# Patient Record
Sex: MALE | Race: WHITE | NOT HISPANIC OR LATINO | URBAN - METROPOLITAN AREA
[De-identification: names, ages, dates, MRNs, and addresses within clinical notes are randomized per-mention and may not be internally consistent; named-entity substitution may affect disease eponyms.]

---

## 2018-06-28 ENCOUNTER — INPATIENT (INPATIENT)
Facility: HOSPITAL | Age: 73
LOS: 3 days | Discharge: HOME | End: 2018-07-02
Attending: INTERNAL MEDICINE | Admitting: INTERNAL MEDICINE

## 2018-06-28 VITALS
TEMPERATURE: 98 F | OXYGEN SATURATION: 96 % | HEART RATE: 88 BPM | RESPIRATION RATE: 20 BRPM | SYSTOLIC BLOOD PRESSURE: 115 MMHG | DIASTOLIC BLOOD PRESSURE: 68 MMHG

## 2018-06-28 LAB
ALBUMIN SERPL ELPH-MCNC: 4.3 G/DL — SIGNIFICANT CHANGE UP (ref 3.5–5.2)
ALP SERPL-CCNC: 60 U/L — SIGNIFICANT CHANGE UP (ref 30–115)
ALT FLD-CCNC: 19 U/L — SIGNIFICANT CHANGE UP (ref 0–41)
ANION GAP SERPL CALC-SCNC: 16 MMOL/L — HIGH (ref 7–14)
APTT BLD: 29.8 SEC — SIGNIFICANT CHANGE UP (ref 27–39.2)
AST SERPL-CCNC: 19 U/L — SIGNIFICANT CHANGE UP (ref 0–41)
BASOPHILS # BLD AUTO: 0.04 K/UL — SIGNIFICANT CHANGE UP (ref 0–0.2)
BASOPHILS NFR BLD AUTO: 0.2 % — SIGNIFICANT CHANGE UP (ref 0–1)
BILIRUB SERPL-MCNC: 0.2 MG/DL — SIGNIFICANT CHANGE UP (ref 0.2–1.2)
BUN SERPL-MCNC: 25 MG/DL — HIGH (ref 10–20)
CALCIUM SERPL-MCNC: 8.7 MG/DL — SIGNIFICANT CHANGE UP (ref 8.5–10.1)
CHLORIDE SERPL-SCNC: 102 MMOL/L — SIGNIFICANT CHANGE UP (ref 98–110)
CK MB CFR SERPL CALC: 3.4 NG/ML — SIGNIFICANT CHANGE UP (ref 0.6–6.3)
CK SERPL-CCNC: 277 U/L — HIGH (ref 0–225)
CO2 SERPL-SCNC: 21 MMOL/L — SIGNIFICANT CHANGE UP (ref 17–32)
CREAT SERPL-MCNC: 1 MG/DL — SIGNIFICANT CHANGE UP (ref 0.7–1.5)
EOSINOPHIL # BLD AUTO: 0.13 K/UL — SIGNIFICANT CHANGE UP (ref 0–0.7)
EOSINOPHIL NFR BLD AUTO: 0.8 % — SIGNIFICANT CHANGE UP (ref 0–8)
GAS PNL BLDV: SIGNIFICANT CHANGE UP
GLUCOSE SERPL-MCNC: 195 MG/DL — HIGH (ref 70–99)
HCT VFR BLD CALC: 42.3 % — SIGNIFICANT CHANGE UP (ref 42–52)
HGB BLD-MCNC: 14 G/DL — SIGNIFICANT CHANGE UP (ref 14–18)
IMM GRANULOCYTES NFR BLD AUTO: 0.6 % — HIGH (ref 0.1–0.3)
INR BLD: 1.07 RATIO — SIGNIFICANT CHANGE UP (ref 0.65–1.3)
LYMPHOCYTES # BLD AUTO: 1.86 K/UL — SIGNIFICANT CHANGE UP (ref 1.2–3.4)
LYMPHOCYTES # BLD AUTO: 11.4 % — LOW (ref 20.5–51.1)
MAGNESIUM SERPL-MCNC: 2.2 MG/DL — SIGNIFICANT CHANGE UP (ref 1.8–2.4)
MCHC RBC-ENTMCNC: 29.4 PG — SIGNIFICANT CHANGE UP (ref 27–31)
MCHC RBC-ENTMCNC: 33.1 G/DL — SIGNIFICANT CHANGE UP (ref 32–37)
MCV RBC AUTO: 88.7 FL — SIGNIFICANT CHANGE UP (ref 80–94)
MONOCYTES # BLD AUTO: 1.4 K/UL — HIGH (ref 0.1–0.6)
MONOCYTES NFR BLD AUTO: 8.6 % — SIGNIFICANT CHANGE UP (ref 1.7–9.3)
NEUTROPHILS # BLD AUTO: 12.75 K/UL — HIGH (ref 1.4–6.5)
NEUTROPHILS NFR BLD AUTO: 78.4 % — HIGH (ref 42.2–75.2)
NT-PROBNP SERPL-SCNC: 41 PG/ML — SIGNIFICANT CHANGE UP (ref 0–300)
PLATELET # BLD AUTO: 202 K/UL — SIGNIFICANT CHANGE UP (ref 130–400)
POTASSIUM SERPL-MCNC: 4.5 MMOL/L — SIGNIFICANT CHANGE UP (ref 3.5–5)
POTASSIUM SERPL-SCNC: 4.5 MMOL/L — SIGNIFICANT CHANGE UP (ref 3.5–5)
PROT SERPL-MCNC: 6.8 G/DL — SIGNIFICANT CHANGE UP (ref 6–8)
PROTHROM AB SERPL-ACNC: 11.5 SEC — SIGNIFICANT CHANGE UP (ref 9.95–12.87)
RBC # BLD: 4.77 M/UL — SIGNIFICANT CHANGE UP (ref 4.7–6.1)
RBC # FLD: 12.8 % — SIGNIFICANT CHANGE UP (ref 11.5–14.5)
SODIUM SERPL-SCNC: 139 MMOL/L — SIGNIFICANT CHANGE UP (ref 135–146)
TROPONIN T SERPL-MCNC: <0.01 NG/ML — SIGNIFICANT CHANGE UP
TYPE + AB SCN PNL BLD: SIGNIFICANT CHANGE UP
WBC # BLD: 16.27 K/UL — HIGH (ref 4.8–10.8)
WBC # FLD AUTO: 16.27 K/UL — HIGH (ref 4.8–10.8)

## 2018-06-28 RX ORDER — ASPIRIN/CALCIUM CARB/MAGNESIUM 324 MG
162 TABLET ORAL ONCE
Qty: 0 | Refills: 0 | Status: COMPLETED | OUTPATIENT
Start: 2018-06-28 | End: 2018-06-28

## 2018-06-28 RX ORDER — SODIUM CHLORIDE 9 MG/ML
1000 INJECTION, SOLUTION INTRAVENOUS ONCE
Qty: 0 | Refills: 0 | Status: COMPLETED | OUTPATIENT
Start: 2018-06-28 | End: 2018-06-28

## 2018-06-28 RX ORDER — CLOPIDOGREL BISULFATE 75 MG/1
600 TABLET, FILM COATED ORAL ONCE
Qty: 0 | Refills: 0 | Status: COMPLETED | OUTPATIENT
Start: 2018-06-28 | End: 2018-06-28

## 2018-06-28 RX ADMIN — SODIUM CHLORIDE 2000 MILLILITER(S): 9 INJECTION, SOLUTION INTRAVENOUS at 22:15

## 2018-06-28 RX ADMIN — Medication 162 MILLIGRAM(S): at 22:21

## 2018-06-28 RX ADMIN — CLOPIDOGREL BISULFATE 600 MILLIGRAM(S): 75 TABLET, FILM COATED ORAL at 22:20

## 2018-06-28 NOTE — ED PROVIDER NOTE - CARE PLAN
Principal Discharge DX:	STEMI (ST elevation myocardial infarction) Principal Discharge DX:	STEMI (ST elevation myocardial infarction)  Assessment and plan of treatment:	Plan: CODE STEMI, EKG, CXR, labs, ASA, plavix, Cath lab, admission to CCU.

## 2018-06-28 NOTE — ED ADULT NURSE REASSESSMENT NOTE - NS ED NURSE REASSESS COMMENT FT1
STEMI activated in ED. pt placed in cardiac monitor, pacing pads in place, cardiac fellow at bedside for evaluation. continue to monitor.

## 2018-06-28 NOTE — ED PROVIDER NOTE - ATTENDING CONTRIBUTION TO CARE
I personally evaluated the patient. I reviewed the Resident’s or Physician Assistant’s note (as assigned above), and agree with the findings and plan except as documented in my note.  Pt polish speaking, daughter in law translating as preferred by pt, no EKG was emailed, EKg strip from EMS in ED showing ii-avf elevation confirmed by EKG obtained in ED. STEMI called immediately and cardiology was called ahead of time as this was a notification for possible STEMI (but no EKG was emailed or transferred over). STEMI called in ED. 73 y/o M w/ pmhx of cad s/p 2 cardiac stents, htn BIBEMS for L sided chest pain associated with diaphoresis and SOB onset at 630pm while walking around. Worsened so called for ambulance. No fever, chills, n/v, pleuritic cp, palpitations, cough, ha/lh/dizziness, numbness/tingling, neck pain/ stiffness, abd pain, diarrhea, constipation, melena/brbpr, urinary symptoms, trauma, weakness, edema, calf pain/swelling/erythema, sick contacts, recent travel or rash.  on exam: wdwn male sitting on stretcher speaking full sentences, no rash, mmm, neck supple. non-tender , radial pulses 2/4 b/l, no jvd, no pain to palpation to chest wall, no pain with movement/ not reproducible, ctabl w/ breath sounds present b/l, no wheezing or crackles, good air exchange, good respiratory effort, no accessory muscle use, no tachypnea, no stridor, bs present throughout all 4 quadrants, abd soft, nd, nt, no rebound tenderness or guarding, no cvat, FROM of ext, no calf pain/swelling/erythema, AAOx3. motor 5/5 and sensation intact throughout upper and lower ext. no focal deficits.

## 2018-06-28 NOTE — CHART NOTE - NSCHARTNOTEFT_GEN_A_CORE
STEMI code overheaded to which I responded and arrived to asses patient. EKG reviewed and patient examined personally by myself. Case d/w Interventional Cardiology attending (Dr. Martel), patient assessed to require emergent cardiac catheterization. Patient given medical treatment as per ACS protocol, will be transferred to cath lab for emergent PCI. Patient informed regarding this and agrees to treatment plan. Case d/w ED staff by myself. Detailed consult note to follow. Salient features of medical plan discussed below:    - angiogram/PCI  - ASA, plavix loading dose, IV heparin, lipitor   - further plan to follow angiogram

## 2018-06-28 NOTE — ED PROVIDER NOTE - CRITICAL CARE PROVIDED
direct patient care (not related to procedure)/consult w/ pt's family directly relating to pts condition/interpretation of diagnostic studies/additional history taking/consultation with other physicians

## 2018-06-28 NOTE — ED ADULT NURSE NOTE - OBJECTIVE STATEMENT
BIBA from home, notification for STEMI. pt c/o left side chest pain started at 630 pm tonight while walking around the house, diaphoretic, and SOB. received 162 mg ASA PO from EMS.

## 2018-06-28 NOTE — ED PROVIDER NOTE - OBJECTIVE STATEMENT
73 y/o M, h/o 2 cardiac stents, BIB EMS for L sided chest pain that radiates to his neck a/w diaphoresis, and SOB onset at 630pm while walking around. His symptoms were not relieved with rest. 73 y/o M, h/o 2 cardiac stents, and high cholesterol, BIB EMS for L sided chest pain that radiates to his neck a/w diaphoresis, and SOB onset at 630pm while walking around. His symptoms were not relieved with rest. No episodes of similar symptoms in the past. Denies palpitations, cough, fever, n/v, abd pain,

## 2018-06-29 LAB
ANION GAP SERPL CALC-SCNC: 12 MMOL/L — SIGNIFICANT CHANGE UP (ref 7–14)
APTT BLD: 31.9 SEC — SIGNIFICANT CHANGE UP (ref 27–39.2)
BASOPHILS # BLD AUTO: 0.01 K/UL — SIGNIFICANT CHANGE UP (ref 0–0.2)
BASOPHILS NFR BLD AUTO: 0.1 % — SIGNIFICANT CHANGE UP (ref 0–1)
BUN SERPL-MCNC: 22 MG/DL — HIGH (ref 10–20)
CALCIUM SERPL-MCNC: 8.3 MG/DL — LOW (ref 8.5–10.1)
CHLORIDE SERPL-SCNC: 103 MMOL/L — SIGNIFICANT CHANGE UP (ref 98–110)
CHOLEST SERPL-MCNC: 100 MG/DL — SIGNIFICANT CHANGE UP (ref 100–200)
CHOLEST SERPL-MCNC: 101 MG/DL — SIGNIFICANT CHANGE UP (ref 100–200)
CK MB CFR SERPL CALC: 128 NG/ML — HIGH (ref 0.6–6.3)
CK MB CFR SERPL CALC: 72.8 NG/ML — HIGH (ref 0.6–6.3)
CK MB CFR SERPL CALC: 93.8 NG/ML — HIGH (ref 0.6–6.3)
CK SERPL-CCNC: 1386 U/L — HIGH (ref 0–225)
CK SERPL-CCNC: 1570 U/L — HIGH (ref 0–225)
CO2 SERPL-SCNC: 21 MMOL/L — SIGNIFICANT CHANGE UP (ref 17–32)
CREAT SERPL-MCNC: 0.7 MG/DL — SIGNIFICANT CHANGE UP (ref 0.7–1.5)
EOSINOPHIL # BLD AUTO: 0 K/UL — SIGNIFICANT CHANGE UP (ref 0–0.7)
EOSINOPHIL NFR BLD AUTO: 0 % — SIGNIFICANT CHANGE UP (ref 0–8)
ESTIMATED AVERAGE GLUCOSE: 126 MG/DL — HIGH (ref 68–114)
ESTIMATED AVERAGE GLUCOSE: 126 MG/DL — HIGH (ref 68–114)
GLUCOSE SERPL-MCNC: 150 MG/DL — HIGH (ref 70–99)
HBA1C BLD-MCNC: 6 % — HIGH (ref 4–5.6)
HBA1C BLD-MCNC: 6 % — HIGH (ref 4–5.6)
HCT VFR BLD CALC: 38.7 % — LOW (ref 42–52)
HDLC SERPL-MCNC: 46 MG/DL — SIGNIFICANT CHANGE UP (ref 40–125)
HDLC SERPL-MCNC: 47 MG/DL — SIGNIFICANT CHANGE UP (ref 40–125)
HGB BLD-MCNC: 12.8 G/DL — LOW (ref 14–18)
IMM GRANULOCYTES NFR BLD AUTO: 0.4 % — HIGH (ref 0.1–0.3)
INR BLD: 1.11 RATIO — SIGNIFICANT CHANGE UP (ref 0.65–1.3)
LIPID PNL WITH DIRECT LDL SERPL: 42 MG/DL — SIGNIFICANT CHANGE UP (ref 4–129)
LIPID PNL WITH DIRECT LDL SERPL: 46 MG/DL — SIGNIFICANT CHANGE UP (ref 4–129)
LYMPHOCYTES # BLD AUTO: 0.75 K/UL — LOW (ref 1.2–3.4)
LYMPHOCYTES # BLD AUTO: 6.7 % — LOW (ref 20.5–51.1)
MCHC RBC-ENTMCNC: 29.4 PG — SIGNIFICANT CHANGE UP (ref 27–31)
MCHC RBC-ENTMCNC: 33.1 G/DL — SIGNIFICANT CHANGE UP (ref 32–37)
MCV RBC AUTO: 89 FL — SIGNIFICANT CHANGE UP (ref 80–94)
MONOCYTES # BLD AUTO: 0.59 K/UL — SIGNIFICANT CHANGE UP (ref 0.1–0.6)
MONOCYTES NFR BLD AUTO: 5.3 % — SIGNIFICANT CHANGE UP (ref 1.7–9.3)
NEUTROPHILS # BLD AUTO: 9.84 K/UL — HIGH (ref 1.4–6.5)
NEUTROPHILS NFR BLD AUTO: 87.5 % — HIGH (ref 42.2–75.2)
NRBC # BLD: 0 /100 WBCS — SIGNIFICANT CHANGE UP (ref 0–0)
PLATELET # BLD AUTO: 163 K/UL — SIGNIFICANT CHANGE UP (ref 130–400)
POTASSIUM SERPL-MCNC: 4.8 MMOL/L — SIGNIFICANT CHANGE UP (ref 3.5–5)
POTASSIUM SERPL-SCNC: 4.8 MMOL/L — SIGNIFICANT CHANGE UP (ref 3.5–5)
PROTHROM AB SERPL-ACNC: 12 SEC — SIGNIFICANT CHANGE UP (ref 9.95–12.87)
RBC # BLD: 4.35 M/UL — LOW (ref 4.7–6.1)
RBC # FLD: 12.9 % — SIGNIFICANT CHANGE UP (ref 11.5–14.5)
SODIUM SERPL-SCNC: 136 MMOL/L — SIGNIFICANT CHANGE UP (ref 135–146)
TOTAL CHOLESTEROL/HDL RATIO MEASUREMENT: 2.1 RATIO — LOW (ref 4–5.5)
TOTAL CHOLESTEROL/HDL RATIO MEASUREMENT: 2.2 RATIO — LOW (ref 4–5.5)
TRIGL SERPL-MCNC: 43 MG/DL — SIGNIFICANT CHANGE UP (ref 10–149)
TRIGL SERPL-MCNC: 84 MG/DL — SIGNIFICANT CHANGE UP (ref 10–149)
TROPONIN T SERPL-MCNC: 3.75 NG/ML — CRITICAL HIGH
TROPONIN T SERPL-MCNC: 3.95 NG/ML — CRITICAL HIGH
WBC # BLD: 11.23 K/UL — HIGH (ref 4.8–10.8)
WBC # FLD AUTO: 11.23 K/UL — HIGH (ref 4.8–10.8)

## 2018-06-29 RX ORDER — EPTIFIBATIDE 2 MG/ML
2 INJECTION, SOLUTION INTRAVENOUS
Qty: 75 | Refills: 0 | Status: DISCONTINUED | OUTPATIENT
Start: 2018-06-29 | End: 2018-06-30

## 2018-06-29 RX ORDER — ENOXAPARIN SODIUM 100 MG/ML
40 INJECTION SUBCUTANEOUS EVERY 24 HOURS
Qty: 0 | Refills: 0 | Status: DISCONTINUED | OUTPATIENT
Start: 2018-06-29 | End: 2018-07-02

## 2018-06-29 RX ORDER — CLOPIDOGREL BISULFATE 75 MG/1
1 TABLET, FILM COATED ORAL
Qty: 0 | Refills: 0 | COMMUNITY

## 2018-06-29 RX ORDER — CLOPIDOGREL BISULFATE 75 MG/1
75 TABLET, FILM COATED ORAL DAILY
Qty: 0 | Refills: 0 | Status: DISCONTINUED | OUTPATIENT
Start: 2018-06-29 | End: 2018-06-30

## 2018-06-29 RX ORDER — SODIUM CHLORIDE 9 MG/ML
1000 INJECTION INTRAMUSCULAR; INTRAVENOUS; SUBCUTANEOUS
Qty: 0 | Refills: 0 | Status: DISCONTINUED | OUTPATIENT
Start: 2018-06-29 | End: 2018-07-02

## 2018-06-29 RX ORDER — ATORVASTATIN CALCIUM 80 MG/1
80 TABLET, FILM COATED ORAL AT BEDTIME
Qty: 0 | Refills: 0 | Status: DISCONTINUED | OUTPATIENT
Start: 2018-06-29 | End: 2018-07-02

## 2018-06-29 RX ORDER — ASPIRIN/CALCIUM CARB/MAGNESIUM 324 MG
81 TABLET ORAL DAILY
Qty: 0 | Refills: 0 | Status: DISCONTINUED | OUTPATIENT
Start: 2018-06-29 | End: 2018-07-02

## 2018-06-29 RX ORDER — METOPROLOL TARTRATE 50 MG
12.5 TABLET ORAL
Qty: 0 | Refills: 0 | Status: DISCONTINUED | OUTPATIENT
Start: 2018-06-29 | End: 2018-07-02

## 2018-06-29 RX ORDER — EPTIFIBATIDE 2 MG/ML
0.5 INJECTION, SOLUTION INTRAVENOUS
Qty: 75 | Refills: 0 | Status: DISCONTINUED | OUTPATIENT
Start: 2018-06-29 | End: 2018-06-29

## 2018-06-29 RX ORDER — METOPROLOL TARTRATE 50 MG
25 TABLET ORAL
Qty: 0 | Refills: 0 | Status: DISCONTINUED | OUTPATIENT
Start: 2018-06-29 | End: 2018-06-29

## 2018-06-29 RX ADMIN — CLOPIDOGREL BISULFATE 75 MILLIGRAM(S): 75 TABLET, FILM COATED ORAL at 11:11

## 2018-06-29 RX ADMIN — SODIUM CHLORIDE 75 MILLILITER(S): 9 INJECTION INTRAMUSCULAR; INTRAVENOUS; SUBCUTANEOUS at 01:35

## 2018-06-29 RX ADMIN — EPTIFIBATIDE 14.58 MICROGRAM(S)/KG/MIN: 2 INJECTION, SOLUTION INTRAVENOUS at 04:23

## 2018-06-29 RX ADMIN — ATORVASTATIN CALCIUM 80 MILLIGRAM(S): 80 TABLET, FILM COATED ORAL at 21:47

## 2018-06-29 RX ADMIN — ENOXAPARIN SODIUM 40 MILLIGRAM(S): 100 INJECTION SUBCUTANEOUS at 18:01

## 2018-06-29 RX ADMIN — Medication 81 MILLIGRAM(S): at 11:11

## 2018-06-29 NOTE — H&P ADULT - PMH
CAD (coronary artery disease)  s/p pci  H/O heart artery stent  x 2  HLD (hyperlipidemia)    HTN (hypertension)

## 2018-06-29 NOTE — H&P ADULT - NSHPLABSRESULTS_GEN_ALL_CORE
14.0   16.27 )-----------( 202      ( 28 Jun 2018 22:31 )             42.3     139  |  102  |  25<H>  ----------------------------<  195<H>  4.5   |  21  |  1.0    Ca    8.7      28 Jun 2018 22:31  Mg     2.2     06-28    TPro  6.8  /  Alb  4.3  /  TBili  0.2  /  DBili  x   /  AST  19  /  ALT  19  /  AlkPhos  60  06-28        PT/INR - ( 28 Jun 2018 22:31 )   PT: 11.50 sec;   INR: 1.07 ratio      PTT - ( 28 Jun 2018 22:31 )  PTT:29.8 sec    CARDIAC MARKERS ( 28 Jun 2018 22:31 )  x     / <0.01 ng/mL / 277 U/L / x     / 3.4 ng/mL    CAPILLARY BLOOD GLUCOSE  181 (28 Jun 2018 22:07)

## 2018-06-29 NOTE — PATIENT PROFILE ADULT. - NS PRO PT REFERRAL QUES 2 YN
Patient placed on continuous cardiac monitor, automatic blood pressure cuff and continuous pulse oximeter.   no

## 2018-06-29 NOTE — H&P ADULT - ASSESSMENT
# STEMI  - cardio following  - s/p pci  - c/w aspirin & plavix  - c/w lipitor    # CAD s/p pci  - c/w home meds    # DLD/ HTN  - lipitor  - c/w home meds    # DVT ppx  - sc lovenox    # Dispo  - pending 72 yom with pmh of with pmh of CAD s/p PCI * 2, htn, dld p/w cc of chest pain since ~ 3 hours ptp.    # STEMI  - cardio following  - s/p JULIANE to RCA  - c/w aspirin & plavix  - c/w lipitor  - on integrin as per cardio  - EKG in am    # CAD s/p pci  - c/w home meds    # DLD/ HTN  - lipitor  - c/w home meds    # DVT ppx  - sc lovenox    # Dispo  - pending 72 yom with pmh of with pmh of CAD s/p PCI * 2, htn, dld p/w cc of chest pain since ~ 3 hours ptp.    # STEMI  - cardio following  - s/p JULIANE to RCA  - c/w aspirin & plavix  - c/w lipitor  - c/w integrillin drip as per cardio  - EKG in am    # CAD s/p pci  - c/w home meds    # DLD/ HTN  - lipitor  - c/w home meds    # DVT ppx  - sc lovenox    # Dispo  - pending

## 2018-06-29 NOTE — CONSULT NOTE ADULT - SUBJECTIVE AND OBJECTIVE BOX
CHIEF COMPLAINT: chest pain (29 Jun 2018 01:11)      HISTORY OF PRESENT ILLNESS:   HPI:  - 73 yo m with pmh of with pmh of CAD s/p PCI x 2, htn, dld p/w cc of chest pain since ~ 3 hours ptp.  - chest pain started around 7pm located on the left sided chest assoc with sob & diaphoresis.  - Pt has history of two previous MI's for which he received cath in [facility].  - In ED, ST Elevations were found on EKG & STEMI code was called     PAST MEDICAL & SURGICAL HISTORY:  CAD (coronary artery disease): s/p pci  H/O heart artery stent: x 2  HLD (hyperlipidemia)  HTN (hypertension)    FAMILY HISTORY:    Allergies    No Known Allergies    Intolerances    	  Home Medications:  aspirin 81 mg oral delayed release tablet: 1 tab(s) orally once a day (29 Jun 2018 01:19)  enalapril 20 mg oral tablet: 1 tab(s) orally once a day (29 Jun 2018 01:18)  Metoprolol Tartrate 25 mg oral tablet: 1 tab(s) orally 2 times a day (29 Jun 2018 01:18)  rosuvastatin 20 mg oral tablet: 1 tab(s) orally once a day (at bedtime) (29 Jun 2018 01:18)    MEDICATIONS  (STANDING):  aspirin  chewable 81 milliGRAM(s) Chew daily  atorvastatin 80 milliGRAM(s) Oral at bedtime  clopidogrel Tablet 75 milliGRAM(s) Oral daily  enalapril 20 milliGRAM(s) Oral daily  enoxaparin Injectable 40 milliGRAM(s) SubCutaneous every 24 hours  eptifibatide Infusion 75 mg/100 mL 2 MICROgram(s)/kG/Min (14.576 mL/Hr) IV Continuous <Continuous>  metoprolol tartrate 25 milliGRAM(s) Oral two times a day  sodium chloride 0.9%. 1000 milliLiter(s) (75 mL/Hr) IV Continuous <Continuous>      SOCIAL HISTORY:    [  ] active smoker  [ x ] non smoker  [  ] Etoh  [  ] recreational drugs    REVIEW OF SYSTEMS:  14 point ROS negative except as mentioned above in HPI    PHYSICAL EXAM:  T(C): 36.6 (06-29-18 @ 00:41), Max: 36.9 (06-28-18 @ 22:07)  HR: 74 (06-29-18 @ 02:00) (54 - 88)  BP: 96/56 (06-29-18 @ 02:00) (96/56 - 129/68)  RR: 18 (06-29-18 @ 02:00) (18 - 27)  SpO2: 93% (06-29-18 @ 02:00) (93% - 98%)  Wt(kg): --  I&O's Summary    28 Jun 2018 07:01  -  29 Jun 2018 02:07  --------------------------------------------------------  IN: 150 mL / OUT: 0 mL / NET: 150 mL        General Appearance: in NAD	  HEENT: NC, No JVD appreciated  Cardiovascular: Normal S1 S2, No murmurs  Respiratory: cta b/l  Gastrointestinal:  Soft, Non-tender, BS +	  Neurologic: No focal deficits, AAOx3  Extremities: ROM wnl, No clubbing/cyanosis/edema  Skin: No rashes, No ecchymoses, No cyanosis  Vascular: Peripheral pulses palpable 2+ bilaterally    LABS:	 	                        14.0   16.27 )-----------( 202      ( 28 Jun 2018 22:31 )             42.3     06-28    139  |  102  |  25<H>  ----------------------------<  195<H>  4.5   |  21  |  1.0    Ca    8.7      28 Jun 2018 22:31  Mg     2.2     06-28    TPro  6.8  /  Alb  4.3  /  TBili  0.2  /  DBili  x   /  AST  19  /  ALT  19  /  AlkPhos  60  06-28    eGFR if Non African American: 76 mL/min/1.73M2 (06-28-18 @ 22:31)        Serum Pro-Brain Natriuretic Peptide: 41 pg/mL (06-28-18 @ 22:31)      CARDIAC MARKERS:  06-28-18 @ 22:31  TROPONIN-T  <0.01 ng/mL  CKMB  3.4 ng/mL  CREATINE KINASE  277 U/L      ECG: ST elevations in II, III, aVF

## 2018-06-29 NOTE — H&P ADULT - NSHPPHYSICALEXAM_GEN_ALL_CORE
GENERAL: NAD, speaks in full sentences, no signs of respiratory distress  HEAD:  Atraumatic, Normocephalic  EYES: EOMI, PERRLA, conjunctiva and sclera clear  NECK: Supple, No JVD  CHEST/LUNG: Clear to auscultation bilaterally; No wheeze; No crackles; No accessory muscles used  HEART: Regular rate and rhythm; No murmurs;   ABDOMEN: Soft, Nontender, Nondistended; Bowel sounds present; No guarding  EXTREMITIES:  2+ Peripheral Pulses, No cyanosis or edema  PSYCH: AAOx3  NEUROLOGY: non-focal  SKIN: No rashes or lesions GENERAL: NAD, speaks in full sentences, no signs of respiratory distress  CHEST/LUNG: Clear to auscultation bilaterally; No wheeze; No crackles; No accessory muscles used  HEART: Regular rate and rhythm; No murmurs;   ABDOMEN: Soft, Nontender, Nondistended; Bowel sounds present; No guarding  EXTREMITIES: No cyanosis or edema  PSYCH: AAOx3  NEUROLOGY: non-focal  SKIN: No rashes or lesions

## 2018-06-29 NOTE — CONSULT NOTE ADULT - ASSESSMENT
1. STEMI/CAD  2. HLD  3. HTN    - admit to CCU  - f/u serial troponins q 4-6 h  - f/u TTE to assess LVEF  - start on ASA, plavix, integrillin drip  - f/u fasting lipid panel and HbA1c to assess ASCVD risk  - c/w high intensity statin treatment

## 2018-06-29 NOTE — H&P ADULT - ATTENDING COMMENTS
I have seen and examined pt independently. I agree with the above resident's history, physical exam and plan except as documented below.  Vital Signs Last 24 Hrs  T(C): 36.1 (29 Jun 2018 04:00), Max: 36.9 (28 Jun 2018 22:07)  T(F): 97 (29 Jun 2018 04:00), Max: 98.5 (28 Jun 2018 22:07)  HR: 56 (29 Jun 2018 05:00) (54 - 88)  BP: 99/57 (29 Jun 2018 05:00) (91/55 - 129/68)  BP(mean): 71 (29 Jun 2018 05:00) (69 - 82)  RR: 19 (29 Jun 2018 05:00) (18 - 27)  SpO2: 95% (29 Jun 2018 05:00) (93% - 98%)    72 yr old male with PMH of CAD/PCI, HTN, DLD p/w left sided chest pressure which started yesterday evening around 7 pm, associated with dyspnea and diaphoresis. Pain was constant and pt presented to ED. All other ROS negative. In ED, STEMI code was called and pt underwent emergent cardiac cath, s/p PCI of RCA. Currently pt is asymptomatic at rest. On exam, AAOx3, not in distress, CTA b/l, s1s2 regular no murmur, no LE edema/cyanosis, abd-soft/NT, no pallor/rash/JVD. Labs, ECG, cardiology noted reviewed. initial ECG- sinus bradycardia with 1st degree AV block, inferolateral ST elevations. Tele- episodes of NSVT (upto 9 beats)    Assessment:  STEMI s/p PCI of RCA  NSVT on Tele  h/o CAD/PCI/HTN/DLD    Plan:  CCU monitoring  ASA, plavix, statin  cont enalapril, metoprolol  repeat CE  integrilin infusion as per cardio  check TTE/lipids/HBA1C  Cardiology f/u  DVT prophylaxis.

## 2018-06-29 NOTE — H&P ADULT - HISTORY OF PRESENT ILLNESS
72 yom with pmh of with pmh of CAD s/p PCI * 2, htn, dld p/w cc of chest pain since ~ 3 hours ptp.  - chest pain started around 7pm located on the left sided chest assoc with sob & diaphoresis.  - Pt has history of two previous MI's for which he received cath in [facility].  - In ED, ST Elevations were found on EKG & STEMI code was called   - Pt was taken to emergent cath & underwent JULIANE to RCA  - During cath, pt had an episode of hypotension s/p atropine, stephanie & v fib s/p 2 shocks--> stabilized. 72 yom with pmh of with pmh of CAD s/p PCI * 2, htn, dld p/w cc of chest pain since ~ 3 hours ptp.  - chest pain started around 7pm located on the left sided chest assoc with sob & diaphoresis.  - Pt has history of two previous MI's for which he received cath in [facility].  - In ED, ST Elevations were found on EKG & STEMI code was called   - Pt was taken to emergent cath & underwent JULIANE to RCA  - During cath, pt had an episode of hypotension s/p atropine, stephanie & v tach/vfib s/p 2 shocks--> stabilized.

## 2018-06-30 LAB
ALBUMIN SERPL ELPH-MCNC: 3.7 G/DL — SIGNIFICANT CHANGE UP (ref 3.5–5.2)
ALP SERPL-CCNC: 47 U/L — SIGNIFICANT CHANGE UP (ref 30–115)
ALT FLD-CCNC: 36 U/L — SIGNIFICANT CHANGE UP (ref 0–41)
ANION GAP SERPL CALC-SCNC: 11 MMOL/L — SIGNIFICANT CHANGE UP (ref 7–14)
AST SERPL-CCNC: 98 U/L — HIGH (ref 0–41)
BASOPHILS # BLD AUTO: 0.02 K/UL — SIGNIFICANT CHANGE UP (ref 0–0.2)
BASOPHILS NFR BLD AUTO: 0.2 % — SIGNIFICANT CHANGE UP (ref 0–1)
BILIRUB SERPL-MCNC: 0.6 MG/DL — SIGNIFICANT CHANGE UP (ref 0.2–1.2)
BUN SERPL-MCNC: 11 MG/DL — SIGNIFICANT CHANGE UP (ref 10–20)
CALCIUM SERPL-MCNC: 8.6 MG/DL — SIGNIFICANT CHANGE UP (ref 8.5–10.1)
CHLORIDE SERPL-SCNC: 105 MMOL/L — SIGNIFICANT CHANGE UP (ref 98–110)
CK MB CFR SERPL CALC: 13.7 NG/ML — HIGH (ref 0.6–6.3)
CK MB CFR SERPL CALC: 39.9 NG/ML — HIGH (ref 0.6–6.3)
CK MB CFR SERPL CALC: 53.6 NG/ML — HIGH (ref 0.6–6.3)
CK SERPL-CCNC: 1101 U/L — HIGH (ref 0–225)
CK SERPL-CCNC: 1184 U/L — HIGH (ref 0–225)
CK SERPL-CCNC: 640 U/L — HIGH (ref 0–225)
CO2 SERPL-SCNC: 23 MMOL/L — SIGNIFICANT CHANGE UP (ref 17–32)
CREAT SERPL-MCNC: 0.9 MG/DL — SIGNIFICANT CHANGE UP (ref 0.7–1.5)
EOSINOPHIL # BLD AUTO: 0.06 K/UL — SIGNIFICANT CHANGE UP (ref 0–0.7)
EOSINOPHIL NFR BLD AUTO: 0.6 % — SIGNIFICANT CHANGE UP (ref 0–8)
GLUCOSE SERPL-MCNC: 117 MG/DL — HIGH (ref 70–99)
HCT VFR BLD CALC: 40.6 % — LOW (ref 42–52)
HGB BLD-MCNC: 13.5 G/DL — LOW (ref 14–18)
IMM GRANULOCYTES NFR BLD AUTO: 0.5 % — HIGH (ref 0.1–0.3)
LYMPHOCYTES # BLD AUTO: 1.9 K/UL — SIGNIFICANT CHANGE UP (ref 1.2–3.4)
LYMPHOCYTES # BLD AUTO: 18.4 % — LOW (ref 20.5–51.1)
MAGNESIUM SERPL-MCNC: 2.2 MG/DL — SIGNIFICANT CHANGE UP (ref 1.8–2.4)
MCHC RBC-ENTMCNC: 29.5 PG — SIGNIFICANT CHANGE UP (ref 27–31)
MCHC RBC-ENTMCNC: 33.3 G/DL — SIGNIFICANT CHANGE UP (ref 32–37)
MCV RBC AUTO: 88.6 FL — SIGNIFICANT CHANGE UP (ref 80–94)
MONOCYTES # BLD AUTO: 1.19 K/UL — HIGH (ref 0.1–0.6)
MONOCYTES NFR BLD AUTO: 11.5 % — HIGH (ref 1.7–9.3)
NEUTROPHILS # BLD AUTO: 7.11 K/UL — HIGH (ref 1.4–6.5)
NEUTROPHILS NFR BLD AUTO: 68.8 % — SIGNIFICANT CHANGE UP (ref 42.2–75.2)
PLATELET # BLD AUTO: 164 K/UL — SIGNIFICANT CHANGE UP (ref 130–400)
POTASSIUM SERPL-MCNC: 4.7 MMOL/L — SIGNIFICANT CHANGE UP (ref 3.5–5)
POTASSIUM SERPL-SCNC: 4.7 MMOL/L — SIGNIFICANT CHANGE UP (ref 3.5–5)
PROT SERPL-MCNC: 6.2 G/DL — SIGNIFICANT CHANGE UP (ref 6–8)
RBC # BLD: 4.58 M/UL — LOW (ref 4.7–6.1)
RBC # FLD: 13.2 % — SIGNIFICANT CHANGE UP (ref 11.5–14.5)
SODIUM SERPL-SCNC: 139 MMOL/L — SIGNIFICANT CHANGE UP (ref 135–146)
TROPONIN T SERPL-MCNC: 2.28 NG/ML — CRITICAL HIGH
TROPONIN T SERPL-MCNC: 3.02 NG/ML — CRITICAL HIGH
TROPONIN T SERPL-MCNC: 3.28 NG/ML — CRITICAL HIGH
WBC # BLD: 10.33 K/UL — SIGNIFICANT CHANGE UP (ref 4.8–10.8)
WBC # FLD AUTO: 10.33 K/UL — SIGNIFICANT CHANGE UP (ref 4.8–10.8)

## 2018-06-30 RX ORDER — TICAGRELOR 90 MG/1
180 TABLET ORAL ONCE
Qty: 0 | Refills: 0 | Status: DISCONTINUED | OUTPATIENT
Start: 2018-06-30 | End: 2018-06-30

## 2018-06-30 RX ORDER — TICAGRELOR 90 MG/1
180 TABLET ORAL ONCE
Qty: 0 | Refills: 0 | Status: COMPLETED | OUTPATIENT
Start: 2018-06-30 | End: 2018-06-30

## 2018-06-30 RX ORDER — TICAGRELOR 90 MG/1
90 TABLET ORAL
Qty: 0 | Refills: 0 | Status: DISCONTINUED | OUTPATIENT
Start: 2018-07-01 | End: 2018-07-02

## 2018-06-30 RX ADMIN — ENOXAPARIN SODIUM 40 MILLIGRAM(S): 100 INJECTION SUBCUTANEOUS at 18:00

## 2018-06-30 RX ADMIN — TICAGRELOR 180 MILLIGRAM(S): 90 TABLET ORAL at 12:21

## 2018-06-30 RX ADMIN — ATORVASTATIN CALCIUM 80 MILLIGRAM(S): 80 TABLET, FILM COATED ORAL at 21:17

## 2018-06-30 RX ADMIN — Medication 81 MILLIGRAM(S): at 12:21

## 2018-06-30 NOTE — PROGRESS NOTE ADULT - ASSESSMENT
72 yr old male with PMH of CAD/PCI, HTN, DLD p/w left sided chest pressure which started yesterday evening around 7 pm, associated with dyspnea and diaphoresis. Pain was constant and pt presented to ED. All other ROS negative. In ED, STEMI code was called and pt underwent emergent cardiac cath, s/p PCI of RCA.     # STEMI- ON aspirin and lipitor and brilinta. s/o PCI to RCA 72 yr old male with PMH of CAD/PCI, HTN, DLD p/w left sided chest pressure which started yesterday evening around 7 pm, associated with dyspnea and diaphoresis. Pain was constant and pt presented to ED. All other ROS negative. In ED, STEMI code was called and pt underwent emergent cardiac cath, s/p PCI of RCA.     # STEMI- ON aspirin and lipitor and brilinta. s/o PCI to RCA    # Bradycardia-metoprolol is held.

## 2018-06-30 NOTE — PROGRESS NOTE ADULT - ASSESSMENT
72 yr old male with PMH of CAD/PCI, HTN, DLD p/w left sided chest pressure which started yesterday evening around 7 pm, associated with dyspnea and diaphoresis. Pain was constant and pt presented to ED. All other ROS negative. In ED, STEMI code was called and pt underwent emergent cardiac cath, s/p PCI of RCA.     1) STEMI  - s/p PCI to   - Aspirin 81mg and atorvastatin  - Brilinta loading dose was given at noon 180mg, with maintenance dose of 90mg q12h    2) Bradycardia  - Two doses of metoprolol is held due to heart rate in the 40's

## 2018-07-01 LAB
ALBUMIN SERPL ELPH-MCNC: 3.6 G/DL — SIGNIFICANT CHANGE UP (ref 3.5–5.2)
ALP SERPL-CCNC: 45 U/L — SIGNIFICANT CHANGE UP (ref 30–115)
ALT FLD-CCNC: 28 U/L — SIGNIFICANT CHANGE UP (ref 0–41)
ANION GAP SERPL CALC-SCNC: 12 MMOL/L — SIGNIFICANT CHANGE UP (ref 7–14)
AST SERPL-CCNC: 49 U/L — HIGH (ref 0–41)
BASOPHILS # BLD AUTO: 0.03 K/UL — SIGNIFICANT CHANGE UP (ref 0–0.2)
BASOPHILS NFR BLD AUTO: 0.3 % — SIGNIFICANT CHANGE UP (ref 0–1)
BILIRUB SERPL-MCNC: 0.5 MG/DL — SIGNIFICANT CHANGE UP (ref 0.2–1.2)
BUN SERPL-MCNC: 15 MG/DL — SIGNIFICANT CHANGE UP (ref 10–20)
CALCIUM SERPL-MCNC: 8.4 MG/DL — LOW (ref 8.5–10.1)
CHLORIDE SERPL-SCNC: 105 MMOL/L — SIGNIFICANT CHANGE UP (ref 98–110)
CK MB CFR SERPL CALC: 6.9 NG/ML — HIGH (ref 0.6–6.3)
CK MB CFR SERPL CALC: 8.6 NG/ML — HIGH (ref 0.6–6.3)
CK SERPL-CCNC: 423 U/L — HIGH (ref 0–225)
CK SERPL-CCNC: 497 U/L — HIGH (ref 0–225)
CO2 SERPL-SCNC: 24 MMOL/L — SIGNIFICANT CHANGE UP (ref 17–32)
CREAT SERPL-MCNC: 0.9 MG/DL — SIGNIFICANT CHANGE UP (ref 0.7–1.5)
EOSINOPHIL # BLD AUTO: 0.19 K/UL — SIGNIFICANT CHANGE UP (ref 0–0.7)
EOSINOPHIL NFR BLD AUTO: 2.1 % — SIGNIFICANT CHANGE UP (ref 0–8)
GLUCOSE SERPL-MCNC: 100 MG/DL — HIGH (ref 70–99)
HCT VFR BLD CALC: 38.6 % — LOW (ref 42–52)
HGB BLD-MCNC: 12.8 G/DL — LOW (ref 14–18)
IMM GRANULOCYTES NFR BLD AUTO: 0.2 % — SIGNIFICANT CHANGE UP (ref 0.1–0.3)
LYMPHOCYTES # BLD AUTO: 2.1 K/UL — SIGNIFICANT CHANGE UP (ref 1.2–3.4)
LYMPHOCYTES # BLD AUTO: 23.3 % — SIGNIFICANT CHANGE UP (ref 20.5–51.1)
MAGNESIUM SERPL-MCNC: 2 MG/DL — SIGNIFICANT CHANGE UP (ref 1.8–2.4)
MCHC RBC-ENTMCNC: 29.3 PG — SIGNIFICANT CHANGE UP (ref 27–31)
MCHC RBC-ENTMCNC: 33.2 G/DL — SIGNIFICANT CHANGE UP (ref 32–37)
MCV RBC AUTO: 88.3 FL — SIGNIFICANT CHANGE UP (ref 80–94)
MONOCYTES # BLD AUTO: 1.01 K/UL — HIGH (ref 0.1–0.6)
MONOCYTES NFR BLD AUTO: 11.2 % — HIGH (ref 1.7–9.3)
NEUTROPHILS # BLD AUTO: 5.65 K/UL — SIGNIFICANT CHANGE UP (ref 1.4–6.5)
NEUTROPHILS NFR BLD AUTO: 62.9 % — SIGNIFICANT CHANGE UP (ref 42.2–75.2)
PLATELET # BLD AUTO: 164 K/UL — SIGNIFICANT CHANGE UP (ref 130–400)
POTASSIUM SERPL-MCNC: 4.1 MMOL/L — SIGNIFICANT CHANGE UP (ref 3.5–5)
POTASSIUM SERPL-SCNC: 4.1 MMOL/L — SIGNIFICANT CHANGE UP (ref 3.5–5)
PROT SERPL-MCNC: 5.8 G/DL — LOW (ref 6–8)
RBC # BLD: 4.37 M/UL — LOW (ref 4.7–6.1)
RBC # FLD: 13 % — SIGNIFICANT CHANGE UP (ref 11.5–14.5)
SODIUM SERPL-SCNC: 141 MMOL/L — SIGNIFICANT CHANGE UP (ref 135–146)
TROPONIN T SERPL-MCNC: 2.32 NG/ML — CRITICAL HIGH
TROPONIN T SERPL-MCNC: 2.33 NG/ML — CRITICAL HIGH
WBC # BLD: 9 K/UL — SIGNIFICANT CHANGE UP (ref 4.8–10.8)
WBC # FLD AUTO: 9 K/UL — SIGNIFICANT CHANGE UP (ref 4.8–10.8)

## 2018-07-01 RX ADMIN — Medication 81 MILLIGRAM(S): at 11:08

## 2018-07-01 RX ADMIN — TICAGRELOR 90 MILLIGRAM(S): 90 TABLET ORAL at 17:19

## 2018-07-01 RX ADMIN — ENOXAPARIN SODIUM 40 MILLIGRAM(S): 100 INJECTION SUBCUTANEOUS at 17:19

## 2018-07-01 RX ADMIN — ATORVASTATIN CALCIUM 80 MILLIGRAM(S): 80 TABLET, FILM COATED ORAL at 21:41

## 2018-07-01 RX ADMIN — TICAGRELOR 90 MILLIGRAM(S): 90 TABLET ORAL at 05:54

## 2018-07-01 NOTE — PROGRESS NOTE ADULT - ASSESSMENT
72 yr old male with PMH of CAD/PCI, HTN, DLD p/w left sided chest pressure which started yesterday evening around 7 pm, associated with dyspnea and diaphoresis. Pain was constant and pt presented to ED. All other ROS negative. In ED, STEMI code was called and pt underwent emergent cardiac cath, s/p PCI of RCA.     # STEMI- ON aspirin and lipitor and brilinta. s/o PCI to RCA    # Bradycardia-metoprolol is held. HR IS better now    To discuss with Dr Martel about DC plan

## 2018-07-01 NOTE — PROGRESS NOTE ADULT - ASSESSMENT
72 yr old male with PMH of CAD/PCI, HTN, DLD p/w left sided chest pressure which started yesterday evening around 7 pm, associated with dyspnea and diaphoresis. Pain was constant and pt presented to ED. All other ROS negative. In ED, STEMI code was called and pt underwent emergent cardiac cath, s/p PCI of RCA.     1) STEMI  - s/p PCI to   - Aspirin 81mg and atorvastatin  - Brilinta loading dose was given at noon 180mg, with maintenance dose of 90mg q12h    2) Bradycardia  - Metorpolol with holding parameters      Lovenox DVT ppx

## 2018-07-02 ENCOUNTER — TRANSCRIPTION ENCOUNTER (OUTPATIENT)
Age: 73
End: 2018-07-02

## 2018-07-02 VITALS
HEART RATE: 60 BPM | DIASTOLIC BLOOD PRESSURE: 74 MMHG | RESPIRATION RATE: 18 BRPM | TEMPERATURE: 97 F | SYSTOLIC BLOOD PRESSURE: 133 MMHG

## 2018-07-02 LAB
ALBUMIN SERPL ELPH-MCNC: 3.8 G/DL — SIGNIFICANT CHANGE UP (ref 3.5–5.2)
ALP SERPL-CCNC: 54 U/L — SIGNIFICANT CHANGE UP (ref 30–115)
ALT FLD-CCNC: 28 U/L — SIGNIFICANT CHANGE UP (ref 0–41)
ANION GAP SERPL CALC-SCNC: 13 MMOL/L — SIGNIFICANT CHANGE UP (ref 7–14)
AST SERPL-CCNC: 30 U/L — SIGNIFICANT CHANGE UP (ref 0–41)
BASOPHILS # BLD AUTO: 0.03 K/UL — SIGNIFICANT CHANGE UP (ref 0–0.2)
BASOPHILS NFR BLD AUTO: 0.3 % — SIGNIFICANT CHANGE UP (ref 0–1)
BILIRUB SERPL-MCNC: 0.7 MG/DL — SIGNIFICANT CHANGE UP (ref 0.2–1.2)
BUN SERPL-MCNC: 16 MG/DL — SIGNIFICANT CHANGE UP (ref 10–20)
CALCIUM SERPL-MCNC: 8.9 MG/DL — SIGNIFICANT CHANGE UP (ref 8.5–10.1)
CHLORIDE SERPL-SCNC: 101 MMOL/L — SIGNIFICANT CHANGE UP (ref 98–110)
CK MB CFR SERPL CALC: 3.2 NG/ML — SIGNIFICANT CHANGE UP (ref 0.6–6.3)
CK SERPL-CCNC: 242 U/L — HIGH (ref 0–225)
CO2 SERPL-SCNC: 24 MMOL/L — SIGNIFICANT CHANGE UP (ref 17–32)
CREAT SERPL-MCNC: 0.9 MG/DL — SIGNIFICANT CHANGE UP (ref 0.7–1.5)
EOSINOPHIL # BLD AUTO: 0.24 K/UL — SIGNIFICANT CHANGE UP (ref 0–0.7)
EOSINOPHIL NFR BLD AUTO: 2.6 % — SIGNIFICANT CHANGE UP (ref 0–8)
GLUCOSE SERPL-MCNC: 86 MG/DL — SIGNIFICANT CHANGE UP (ref 70–99)
HCT VFR BLD CALC: 42.2 % — SIGNIFICANT CHANGE UP (ref 42–52)
HGB BLD-MCNC: 13.8 G/DL — LOW (ref 14–18)
IMM GRANULOCYTES NFR BLD AUTO: 0.3 % — SIGNIFICANT CHANGE UP (ref 0.1–0.3)
LYMPHOCYTES # BLD AUTO: 2.25 K/UL — SIGNIFICANT CHANGE UP (ref 1.2–3.4)
LYMPHOCYTES # BLD AUTO: 23.9 % — SIGNIFICANT CHANGE UP (ref 20.5–51.1)
MAGNESIUM SERPL-MCNC: 2.1 MG/DL — SIGNIFICANT CHANGE UP (ref 1.8–2.4)
MCHC RBC-ENTMCNC: 29.1 PG — SIGNIFICANT CHANGE UP (ref 27–31)
MCHC RBC-ENTMCNC: 32.7 G/DL — SIGNIFICANT CHANGE UP (ref 32–37)
MCV RBC AUTO: 89 FL — SIGNIFICANT CHANGE UP (ref 80–94)
MONOCYTES # BLD AUTO: 1.07 K/UL — HIGH (ref 0.1–0.6)
MONOCYTES NFR BLD AUTO: 11.4 % — HIGH (ref 1.7–9.3)
NEUTROPHILS # BLD AUTO: 5.79 K/UL — SIGNIFICANT CHANGE UP (ref 1.4–6.5)
NEUTROPHILS NFR BLD AUTO: 61.5 % — SIGNIFICANT CHANGE UP (ref 42.2–75.2)
PLATELET # BLD AUTO: 183 K/UL — SIGNIFICANT CHANGE UP (ref 130–400)
POTASSIUM SERPL-MCNC: 4.3 MMOL/L — SIGNIFICANT CHANGE UP (ref 3.5–5)
POTASSIUM SERPL-SCNC: 4.3 MMOL/L — SIGNIFICANT CHANGE UP (ref 3.5–5)
PROT SERPL-MCNC: 6.3 G/DL — SIGNIFICANT CHANGE UP (ref 6–8)
RBC # BLD: 4.74 M/UL — SIGNIFICANT CHANGE UP (ref 4.7–6.1)
RBC # FLD: 13 % — SIGNIFICANT CHANGE UP (ref 11.5–14.5)
SODIUM SERPL-SCNC: 138 MMOL/L — SIGNIFICANT CHANGE UP (ref 135–146)
TROPONIN T SERPL-MCNC: 2.39 NG/ML — CRITICAL HIGH
WBC # BLD: 9.41 K/UL — SIGNIFICANT CHANGE UP (ref 4.8–10.8)
WBC # FLD AUTO: 9.41 K/UL — SIGNIFICANT CHANGE UP (ref 4.8–10.8)

## 2018-07-02 RX ORDER — TICAGRELOR 90 MG/1
1 TABLET ORAL
Qty: 60 | Refills: 0 | OUTPATIENT
Start: 2018-07-02 | End: 2018-07-31

## 2018-07-02 RX ORDER — ASPIRIN/CALCIUM CARB/MAGNESIUM 324 MG
1 TABLET ORAL
Qty: 30 | Refills: 0 | OUTPATIENT
Start: 2018-07-02 | End: 2018-07-31

## 2018-07-02 RX ORDER — METOPROLOL TARTRATE 50 MG
1 TABLET ORAL
Qty: 0 | Refills: 0 | COMMUNITY

## 2018-07-02 RX ORDER — ASPIRIN/CALCIUM CARB/MAGNESIUM 324 MG
1 TABLET ORAL
Qty: 0 | Refills: 0 | COMMUNITY

## 2018-07-02 RX ORDER — ATORVASTATIN CALCIUM 80 MG/1
1 TABLET, FILM COATED ORAL
Qty: 30 | Refills: 0 | OUTPATIENT
Start: 2018-07-02 | End: 2018-07-31

## 2018-07-02 RX ORDER — ROSUVASTATIN CALCIUM 5 MG/1
1 TABLET ORAL
Qty: 0 | Refills: 0 | COMMUNITY

## 2018-07-02 RX ORDER — METOPROLOL TARTRATE 50 MG
0.5 TABLET ORAL
Qty: 30 | Refills: 0 | OUTPATIENT
Start: 2018-07-02 | End: 2018-07-31

## 2018-07-02 RX ADMIN — Medication 81 MILLIGRAM(S): at 11:40

## 2018-07-02 RX ADMIN — TICAGRELOR 90 MILLIGRAM(S): 90 TABLET ORAL at 05:34

## 2018-07-02 NOTE — DISCHARGE NOTE ADULT - HOSPITAL COURSE
72 yom with pmh of with pmh of CAD s/p PCI * 2, htn, dld p/w cc of chest pain since ~ 3 hours ptp.  - chest pain started around 7pm located on the left sided chest assoc with sob & diaphoresis.  - Pt has history of two previous MI's for which he received cath in [facility].  - In ED, ST Elevations were found on EKG & STEMI code was called   - Pt was taken to emergent cath & underwent JULIANE to RCA  - During cath, pt had an episode of hypotension s/p atropine, stephanie & v tach/vfib s/p 2 shocks--> stabilized.  was managed in CCU for couple of days then downgraded to 3C on 7/1/2018.  Followed up with cardiology Dr. Delonte house to be d/courtney on Aspirin, BB. lipitor, Brillinta. Held enalapril for hypotension.  Spoke with help of  [id number] instructed pt about meds and follow up,he understood.

## 2018-07-02 NOTE — PROGRESS NOTE ADULT - SUBJECTIVE AND OBJECTIVE BOX
SUBJ:No chest pain or shortness of breath      MEDICATIONS  (STANDING):  aspirin  chewable 81 milliGRAM(s) Chew daily  atorvastatin 80 milliGRAM(s) Oral at bedtime  clopidogrel Tablet 75 milliGRAM(s) Oral daily  enoxaparin Injectable 40 milliGRAM(s) SubCutaneous every 24 hours  eptifibatide Infusion 75 mg/100 mL 2 MICROgram(s)/kG/Min (14.576 mL/Hr) IV Continuous <Continuous>  metoprolol tartrate 12.5 milliGRAM(s) Oral two times a day  sodium chloride 0.9%. 1000 milliLiter(s) (75 mL/Hr) IV Continuous <Continuous>    MEDICATIONS  (PRN):            Vital Signs Last 24 Hrs  T(C): 36.7 (30 Jun 2018 08:00), Max: 36.7 (29 Jun 2018 12:00)  T(F): 98.1 (30 Jun 2018 08:00), Max: 98.1 (30 Jun 2018 08:00)  HR: 52 (30 Jun 2018 11:00) (43 - 64)  BP: 120/72 (30 Jun 2018 10:00) (98/59 - 121/71)  BP(mean): 88 (30 Jun 2018 10:00) (60 - 88)  RR: 18 (30 Jun 2018 10:00) (18 - 28)  SpO2: 97% (30 Jun 2018 08:00) (97% - 98%)      ECG:NML    TTE:    LABS:                        13.5   10.33 )-----------( 164      ( 30 Jun 2018 05:02 )             40.6     06-30    139  |  105  |  11  ----------------------------<  117<H>  4.7   |  23  |  0.9    Ca    8.6      30 Jun 2018 05:02  Mg     2.2     06-30    TPro  6.2  /  Alb  3.7  /  TBili  0.6  /  DBili  x   /  AST  98<H>  /  ALT  36  /  AlkPhos  47  06-30    CARDIAC MARKERS ( 30 Jun 2018 05:02 )  x     / 3.02 ng/mL / 1101 U/L / x     / 39.9 ng/mL  CARDIAC MARKERS ( 30 Jun 2018 00:28 )  x     / 3.28 ng/mL / 1184 U/L / x     / 53.6 ng/mL  CARDIAC MARKERS ( 29 Jun 2018 20:07 )  x     / 3.75 ng/mL / 1386 U/L / x     / 72.8 ng/mL  CARDIAC MARKERS ( 29 Jun 2018 17:22 )  x     / 3.95 ng/mL / 1570 U/L / x     / 93.8 ng/mL  CARDIAC MARKERS ( 29 Jun 2018 05:11 )  x     / 5.71 ng/mL / x     / x     / 128.0 ng/mL  CARDIAC MARKERS ( 28 Jun 2018 22:31 )  x     / <0.01 ng/mL / 277 U/L / x     / 3.4 ng/mL      PT/INR - ( 29 Jun 2018 05:11 )   PT: 12.00 sec;   INR: 1.11 ratio         PTT - ( 29 Jun 2018 05:11 )  PTT:31.9 sec    I&O's Summary    29 Jun 2018 07:01  -  30 Jun 2018 07:00  --------------------------------------------------------  IN: 960 mL / OUT: 600 mL / NET: 360 mL    30 Jun 2018 07:01  -  30 Jun 2018 11:08  --------------------------------------------------------  IN: 240 mL / OUT: 300 mL / NET: -60 mL      BNP
Cardiology Follow up    FLORENCIA SIMMONS   72yMale  PAST MEDICAL & SURGICAL HISTORY:  CAD (coronary artery disease): s/p pci  H/O heart artery stent: x 2  HLD (hyperlipidemia)  HTN (hypertension)    Allergies    No Known Allergies    Intolerances        Patient without complaints.   Denies CP, SOB, palpitations, or dizziness  NSVT on tele    Vital Signs Last 24 Hrs  T(C): 36.7 (29 Jun 2018 12:00), Max: 36.9 (28 Jun 2018 22:07)  T(F): 98 (29 Jun 2018 12:00), Max: 98.5 (28 Jun 2018 22:07)  HR: 50 (29 Jun 2018 12:00) (50 - 88)  BP: 106/62 (29 Jun 2018 12:00) (91/55 - 129/68)  BP(mean): 84 (29 Jun 2018 12:00) (69 - 90)  RR: 23 (29 Jun 2018 12:00) (18 - 29)  SpO2: 98% (29 Jun 2018 12:00) (93% - 99%)Allergies    REVIEW OF SYSTEMS:    CONSTITUTIONAL: No weakness, fevers or chills  EYES/ENT: No visual changes;  No vertigo or throat pain   NECK: No pain or stiffness  RESPIRATORY: No cough, wheezing, hemoptysis; No shortness of breath  CARDIOVASCULAR: No chest pain or palpitations  GASTROINTESTINAL: No abdominal or epigastric pain. No nausea, vomiting, or hematemesis; No diarrhea or constipation. No melena or hematochezia.  GENITOURINARY: No dysuria, frequency or hematuria  NEUROLOGICAL: No numbness or weakness  SKIN: No itching, rashes      NAD, appears well  S1S2, no murmurs, no JVD  CTA B/L, no wheeze, no rales  SNT +BS  Ext:    Right Groin:  NO hematoma,     NO bruit, dressing; C/D/I  , + pulses   Pulses:  +Rad/ +PTs /+DPs/ same as baseline  A&Ox 3    EKG   Ventricular Rate 55 BPM    Atrial Rate 55 BPM    P-R Interval 216 ms    QRS Duration 84 ms    Q-T Interval 464 ms    QTC Calculation(Bezet) 443 ms    P Axis 36 degrees    R Axis 14 degrees    T Axis 7 degrees    Diagnosis Line Sinus bradycardia with 1st degree A-V block  T wave abnormality, consider inferior ischemia  Abnormal ECG    Confirmed by MAO GONZALEZ MD ([id number]) on 6/29/2018 8:27:53 AM                                                                                                                 2D ECHO  P    LABS                        12.8   11.23 )-----------( 163      ( 29 Jun 2018 05:11 )             38.7     06-29    136  |  103  |  22<H>  ----------------------------<  150<H>  4.8   |  21  |  0.7    Ca    8.3<L>      29 Jun 2018 05:11  Mg     2.3     06-29    TPro  6.8  /  Alb  4.3  /  TBili  0.2  /  DBili  x   /  AST  19  /  ALT  19  /  AlkPhos  60  06-28    CARDIAC MARKERS ( 29 Jun 2018 05:11 )  x     / 5.71 ng/mL / x     / x     / 128.0 ng/mL  CARDIAC MARKERS ( 28 Jun 2018 22:31 )  x     / <0.01 ng/mL / 277 U/L / x     / 3.4 ng/mL      Magnesium, Serum: 2.3 mg/dL [1.8 - 2.4] (06-29-18 @ 05:11)  Magnesium, Serum: 2.2 mg/dL [1.8 - 2.4] (06-28-18 @ 22:31)  LIVER FUNCTIONS - ( 28 Jun 2018 22:31 )  Alb: 4.3 g/dL / Pro: 6.8 g/dL / ALK PHOS: 60 U/L / ALT: 19 U/L / AST: 19 U/L / GGT: x             A/P:  I discussed the case with Interventional Cardiologist Dr. Martel  & recommends the following:    S/P PCI dRCAx2/STEMI  	         Continue DAPT(asa 81mg plavix 75mg daily), Statin Therapy                    add BB as HR allows,                    monitor access site                   f/u echo results                   30 day supply of DAPT given by RN for home use only                    Pt given instructions on importance of taking antiplatelet medication or risk acute stent thrombosis/death                   Post cath instructions, access site care and activity restrictions reviewed with patient                     Discussed with patient to return to hospital if experience chest pain, shortness breath, dizziness and site bleeding                   Aggressive risk factor modification, diet counseling, smoking cessation discussed with patient                                           Follow up with Cardiology Dr. Martel
PREOPERATIVE DAY OF PROCEDURE EVALUATION NOTE      Patient aware of risk and benefits of planned procedure up to and including death and agrees with planned procedure.    I have personally seen and examined the patient.  I agree with the history and physical which I have reviewed and noted any changes below. (Signed electronically  Jake Martel MD)   06-29-18 @ 02:02      POST OPERATIVE PROCEDURAL DOCUMENTATION  PRE-OP DIAGNOSIS:  STEMI    PROCEDURE:   LEFT HEART CATHERIZATION    Physician:BRYANT Martel MD  Assistant:  MD    ANESTHESIA TYPE:  [ X] Sedation  [ X] Local/Regional  [   ]General Anesthesia    ESTIMATED BLOOD LOSS:      less than 10 mL    CONDITION  [X ] Good  [   ] Fair  [   ] Serious  [   ] Critical      SPECIMENS REMOVED (IF APPLICABLE):   None          IMPLANTS (IF APPLICABLE)  stent RCA    FINDINGS:  LEFT HEART CATHERIZATION                                    LVEF%:  LVEDP:    Left main: mild    LAD:      mild to moderate              Diag:      Left Circumflex: mild to moderate  OM:  severe stenosis    Right Cornary Artery:  100%  RPDA:    RPL:        RIGHT HEART CATHERIZATION  PA:  PCW:  CO/CI:    PERCUTANEOUS CORONARY INTERVENTIONS: stent RCA      COMPLICATIONS:  None  PT w/ vfib x2 w/ successful shock    POST-OP DIAGNOSIS    CAD  STEMI w/ successful stent RCA      PLAN OF CARE
SUBJ:No chest pain or shortness of breath      MEDICATIONS  (STANDING):  aspirin  chewable 81 milliGRAM(s) Chew daily  atorvastatin 80 milliGRAM(s) Oral at bedtime  enoxaparin Injectable 40 milliGRAM(s) SubCutaneous every 24 hours  metoprolol tartrate 12.5 milliGRAM(s) Oral two times a day  sodium chloride 0.9%. 1000 milliLiter(s) (75 mL/Hr) IV Continuous <Continuous>  ticagrelor 90 milliGRAM(s) Oral two times a day    MEDICATIONS  (PRN):            Vital Signs Last 24 Hrs  T(C): 36.3 (02 Jul 2018 05:02), Max: 36.4 (01 Jul 2018 17:11)  T(F): 97.3 (02 Jul 2018 05:02), Max: 97.5 (01 Jul 2018 17:11)  HR: 56 (02 Jul 2018 05:02) (52 - 76)  BP: 90/52 (02 Jul 2018 05:02) (85/55 - 118/70)  BP(mean): 66 (01 Jul 2018 16:00) (66 - 85)  RR: 18 (02 Jul 2018 05:02) (18 - 20)  SpO2: 96% (01 Jul 2018 20:05) (96% - 98%)      ECG:NML    TTE:    LABS:                        13.8   9.41  )-----------( 183      ( 02 Jul 2018 07:20 )             42.2     07-02    138  |  101  |  16  ----------------------------<  86  4.3   |  24  |  0.9    Ca    8.9      02 Jul 2018 07:20  Mg     2.1     07-02    TPro  6.3  /  Alb  3.8  /  TBili  0.7  /  DBili  x   /  AST  30  /  ALT  28  /  AlkPhos  54  07-02    CARDIAC MARKERS ( 02 Jul 2018 07:20 )  x     / 2.39 ng/mL / 242 U/L / x     / 3.2 ng/mL  CARDIAC MARKERS ( 01 Jul 2018 05:29 )  x     / 2.32 ng/mL / 423 U/L / x     / 6.9 ng/mL  CARDIAC MARKERS ( 01 Jul 2018 00:40 )  x     / 2.33 ng/mL / 497 U/L / x     / 8.6 ng/mL  CARDIAC MARKERS ( 30 Jun 2018 17:58 )  x     / 2.28 ng/mL / 640 U/L / x     / 13.7 ng/mL          I&O's Summary    01 Jul 2018 07:01  -  02 Jul 2018 07:00  --------------------------------------------------------  IN: 720 mL / OUT: 1000 mL / NET: -280 mL      BNP
SUBJECTIVE:    Patient is a 72y old Male who presents with a chief complaint of stemi (02 Jul 2018 11:17)    Currently admitted to medicine with the primary diagnosis of STEMI (ST elevation myocardial infarction)     Today is hospital day 4d. This morning he is resting comfortably in bed and reports no new issues or overnight events.     PAST MEDICAL & SURGICAL HISTORY  CAD (coronary artery disease): s/p pci  H/O heart artery stent: x 2  HLD (hyperlipidemia)  HTN (hypertension)    SOCIAL HISTORY:  Negative for smoking/alcohol/drug use.     ALLERGIES:  No Known Allergies    MEDICATIONS:  STANDING MEDICATIONS  aspirin  chewable 81 milliGRAM(s) Chew daily  atorvastatin 80 milliGRAM(s) Oral at bedtime  enoxaparin Injectable 40 milliGRAM(s) SubCutaneous every 24 hours  metoprolol tartrate 12.5 milliGRAM(s) Oral two times a day  sodium chloride 0.9%. 1000 milliLiter(s) IV Continuous <Continuous>  ticagrelor 90 milliGRAM(s) Oral two times a day    PRN MEDICATIONS    VITALS:   T(F): 96.9  HR: 60  BP: 133/74  RR: 18  SpO2: 96%    LABS:                        13.8   9.41  )-----------( 183      ( 02 Jul 2018 07:20 )             42.2     07-02    138  |  101  |  16  ----------------------------<  86  4.3   |  24  |  0.9    Ca    8.9      02 Jul 2018 07:20  Mg     2.1     07-02    TPro  6.3  /  Alb  3.8  /  TBili  0.7  /  DBili  x   /  AST  30  /  ALT  28  /  AlkPhos  54  07-02          Creatine Kinase, Serum: 242 U/L <H> (07-02-18 @ 07:20)  Troponin T, Serum: 2.39 ng/mL <HH> (07-02-18 @ 07:20)      CARDIAC MARKERS ( 02 Jul 2018 07:20 )  x     / 2.39 ng/mL / 242 U/L / x     / 3.2 ng/mL  CARDIAC MARKERS ( 01 Jul 2018 05:29 )  x     / 2.32 ng/mL / 423 U/L / x     / 6.9 ng/mL  CARDIAC MARKERS ( 01 Jul 2018 00:40 )  x     / 2.33 ng/mL / 497 U/L / x     / 8.6 ng/mL  CARDIAC MARKERS ( 30 Jun 2018 17:58 )  x     / 2.28 ng/mL / 640 U/L / x     / 13.7 ng/mL      RADIOLOGY:    PHYSICAL EXAM:  GEN: No acute distress  LUNGS: Clear to auscultation bilaterally   HEART: S1/S2 present. RRR.   ABD/ GI: Soft, non-tender, non-distended. Bowel sounds present  EXT: NC/NC/NE/2+PP/LANDAVERDE  NEURO: AAOX3
SUBJECTIVE:    Patient is a 72y old Male who presents with a chief complaint of stemi (2018 01:11)    No acute events noted    PAST MEDICAL & SURGICAL HISTORY  CAD (coronary artery disease): s/p pci  H/O heart artery stent: x 2  HLD (hyperlipidemia)  HTN (hypertension)    SOCIAL HISTORY:  Negative for smoking/alcohol/drug use.     ALLERGIES:  No Known Allergies    Daily     Daily Weight in k (2018 05:48)    Diet, DASH/TLC:   Sodium & Cholesterol Restricted (18 @ 01:36)    CURRENT MEDS:  metoprolol tartrate 12.5 milliGRAM(s) Oral two times a day  sodium chloride 0.9%. 1000 milliLiter(s) IV Continuous <Continuous>  aspirin  chewable 81 milliGRAM(s) Chew daily  enoxaparin Injectable 40 milliGRAM(s) SubCutaneous every 24 hours  ticagrelor 90 milliGRAM(s) Oral two times a day  atorvastatin 80 milliGRAM(s) Oral at bedtime    ICU Vital Signs Last 24 Hrs  T(F): 97 (2018 08:00), Max: 97.7 (2018 20:00)  HR: 62 (2018 10:00) (46 - 68)  BP: 140/68 (2018 10:00) (87/51 - 140/68)  BP(mean): 95 (2018 10:00) (78 - 95)  RR: 18 (2018 10:00) (16 - 23)  SpO2: 99% (2018 08:00) (97% - 99%)    I&O's Summary      -  2018 07:00  --------------------------------------------------------  IN: 940 mL / OUT: 1100 mL / NET: -160 mL    :  -  2018 11:23  --------------------------------------------------------  IN: 240 mL / OUT: 300 mL / NET: -60 mL      I&O's Detail    2018 07:  -  2018 07:00  --------------------------------------------------------  IN:    Oral Fluid: 940 mL  Total IN: 940 mL    OUT:    Voided: 1100 mL  Total OUT: 1100 mL    Total NET: -160 mL      2018 07:  -  2018 11:23  --------------------------------------------------------  IN:    Oral Fluid: 240 mL  Total IN: 240 mL    OUT:    Voided: 300 mL  Total OUT: 300 mL      LABS:                          12.8   9.00  )-----------( 164      ( 2018 05:29 )             38.6       07-01    141  |  105  |  15  ----------------------------<  100<H>  4.1   |  24  |  0.9    Ca    8.4<L>      2018 05:29  Mg     2.0     07-01  TPro  5.8<L>  /  Alb  3.6  /  TBili  0.5  /  DBili  x   /  AST  49<H>  /  ALT  28  /  AlkPhos  45  07-01  CARDIAC MARKERS ( 2018 05:29 )  x     / 2.32 ng/mL / 423 U/L / x     / 6.9 ng/mL  CARDIAC MARKERS ( 2018 00:40 )  x     / 2.33 ng/mL / 497 U/L / x     / 8.6 ng/mL  CARDIAC MARKERS ( 2018 17:58 )  x     / 2.28 ng/mL / 640 U/L / x     / 13.7 ng/mL  CARDIAC MARKERS ( 2018 05:02 )  x     / 3.02 ng/mL / 1101 U/L / x     / 39.9 ng/mL  CARDIAC MARKERS ( 2018 00:28 )  x     / 3.28 ng/mL / 1184 U/L / x     / 53.6 ng/mL  CARDIAC MARKERS ( 2018 20:07 )  x     / 3.75 ng/mL / 1386 U/L / x     / 72.8 ng/mL  CARDIAC MARKERS ( 2018 17:22 )  x     / 3.95 ng/mL / 1570 U/L / x     / 93.8 ng/mL      PHYSICAL EXAM:  GEN: NAD, comfortable  LUNGS: CTAB, no w/r/r  HEART: RRR, no m/r/g  ABD: soft, NT/ND, +BS  EXT: no edema, PP b/l  NEURO: AAOx3
SUBJECTIVE:    Patient is a 72y old Male who presents with a chief complaint of stemi (29 Jun 2018 01:11)    Currently admitted to medicine with the primary diagnosis of STEMI (ST elevation myocardial infarction)     Today is hospital day 2d. This morning he is resting comfortably in bed and reports no new issues or overnight events.     PAST MEDICAL & SURGICAL HISTORY  CAD (coronary artery disease): s/p pci  H/O heart artery stent: x 2  HLD (hyperlipidemia)  HTN (hypertension)    SOCIAL HISTORY:  Negative for smoking/alcohol/drug use.     ALLERGIES:  No Known Allergies    MEDICATIONS:  STANDING MEDICATIONS  aspirin  chewable 81 milliGRAM(s) Chew daily  atorvastatin 80 milliGRAM(s) Oral at bedtime  enoxaparin Injectable 40 milliGRAM(s) SubCutaneous every 24 hours  metoprolol tartrate 12.5 milliGRAM(s) Oral two times a day  sodium chloride 0.9%. 1000 milliLiter(s) IV Continuous <Continuous>    PRN MEDICATIONS    VITALS:   T(F): 97.2  HR: 52  BP: 107/67  RR: 20  SpO2: 98%    LABS:                        13.5   10.33 )-----------( 164      ( 30 Jun 2018 05:02 )             40.6     06-30    139  |  105  |  11  ----------------------------<  117<H>  4.7   |  23  |  0.9    Ca    8.6      30 Jun 2018 05:02  Mg     2.2     06-30    TPro  6.2  /  Alb  3.7  /  TBili  0.6  /  DBili  x   /  AST  98<H>  /  ALT  36  /  AlkPhos  47  06-30    PT/INR - ( 29 Jun 2018 05:11 )   PT: 12.00 sec;   INR: 1.11 ratio         PTT - ( 29 Jun 2018 05:11 )  PTT:31.9 sec      Creatine Kinase, Serum: 1101 U/L <H> (06-30-18 @ 05:02)  Troponin T, Serum: 3.02 ng/mL <HH> (06-30-18 @ 05:02)  Creatine Kinase, Serum: 1184 U/L <H> (06-30-18 @ 00:28)  Troponin T, Serum: 3.28 ng/mL <HH> (06-30-18 @ 00:28)  Creatine Kinase, Serum: 1386 U/L <H> (06-29-18 @ 20:07)  Troponin T, Serum: 3.75 ng/mL <HH> (06-29-18 @ 20:07)  Troponin T, Serum: 3.95 ng/mL <HH> (06-29-18 @ 17:22)  Creatine Kinase, Serum: 1570 U/L <H> (06-29-18 @ 17:22)      CARDIAC MARKERS ( 30 Jun 2018 05:02 )  x     / 3.02 ng/mL / 1101 U/L / x     / 39.9 ng/mL  CARDIAC MARKERS ( 30 Jun 2018 00:28 )  x     / 3.28 ng/mL / 1184 U/L / x     / 53.6 ng/mL  CARDIAC MARKERS ( 29 Jun 2018 20:07 )  x     / 3.75 ng/mL / 1386 U/L / x     / 72.8 ng/mL  CARDIAC MARKERS ( 29 Jun 2018 17:22 )  x     / 3.95 ng/mL / 1570 U/L / x     / 93.8 ng/mL  CARDIAC MARKERS ( 29 Jun 2018 05:11 )  x     / 5.71 ng/mL / x     / x     / 128.0 ng/mL  CARDIAC MARKERS ( 28 Jun 2018 22:31 )  x     / <0.01 ng/mL / 277 U/L / x     / 3.4 ng/mL      RADIOLOGY:    PHYSICAL EXAM:  GEN: No acute distress  LUNGS: Clear to auscultation bilaterally   HEART: S1/S2 present. RRR.   ABD/ GI: Soft, non-tender, non-distended. Bowel sounds present  EXT: NC/NC/NE/2+PP/LANDAVERDE  NEURO: AAOX3
SUBJECTIVE:    Patient is a 72y old Male who presents with a chief complaint of stemi (29 Jun 2018 01:11)    Currently admitted to medicine with the primary diagnosis of STEMI (ST elevation myocardial infarction)     Today is hospital day 3d. This morning he is resting comfortably in bed and reports no new issues or overnight events.     PAST MEDICAL & SURGICAL HISTORY  CAD (coronary artery disease): s/p pci  H/O heart artery stent: x 2  HLD (hyperlipidemia)  HTN (hypertension)    SOCIAL HISTORY:  Negative for smoking/alcohol/drug use.     ALLERGIES:  No Known Allergies    MEDICATIONS:  STANDING MEDICATIONS  aspirin  chewable 81 milliGRAM(s) Chew daily  atorvastatin 80 milliGRAM(s) Oral at bedtime  enoxaparin Injectable 40 milliGRAM(s) SubCutaneous every 24 hours  metoprolol tartrate 12.5 milliGRAM(s) Oral two times a day  sodium chloride 0.9%. 1000 milliLiter(s) IV Continuous <Continuous>  ticagrelor 90 milliGRAM(s) Oral two times a day    PRN MEDICATIONS    VITALS:   T(F): 97.1  HR: 52  BP: 123/70  RR: 20  SpO2: 98%    LABS:                        12.8   9.00  )-----------( 164      ( 01 Jul 2018 05:29 )             38.6     07-01    141  |  105  |  15  ----------------------------<  100<H>  4.1   |  24  |  0.9    Ca    8.4<L>      01 Jul 2018 05:29  Mg     2.0     07-01    TPro  5.8<L>  /  Alb  3.6  /  TBili  0.5  /  DBili  x   /  AST  49<H>  /  ALT  28  /  AlkPhos  45  07-01          Creatine Kinase, Serum: 423 U/L <H> (07-01-18 @ 05:29)  Troponin T, Serum: 2.32 ng/mL <HH> (07-01-18 @ 05:29)  Creatine Kinase, Serum: 497 U/L <H> (07-01-18 @ 00:40)  Troponin T, Serum: 2.33 ng/mL <HH> (07-01-18 @ 00:40)  Creatine Kinase, Serum: 640 U/L <H> (06-30-18 @ 17:58)  Troponin T, Serum: 2.28 ng/mL <HH> (06-30-18 @ 17:58)      CARDIAC MARKERS ( 01 Jul 2018 05:29 )  x     / 2.32 ng/mL / 423 U/L / x     / 6.9 ng/mL  CARDIAC MARKERS ( 01 Jul 2018 00:40 )  x     / 2.33 ng/mL / 497 U/L / x     / 8.6 ng/mL  CARDIAC MARKERS ( 30 Jun 2018 17:58 )  x     / 2.28 ng/mL / 640 U/L / x     / 13.7 ng/mL  CARDIAC MARKERS ( 30 Jun 2018 05:02 )  x     / 3.02 ng/mL / 1101 U/L / x     / 39.9 ng/mL  CARDIAC MARKERS ( 30 Jun 2018 00:28 )  x     / 3.28 ng/mL / 1184 U/L / x     / 53.6 ng/mL  CARDIAC MARKERS ( 29 Jun 2018 20:07 )  x     / 3.75 ng/mL / 1386 U/L / x     / 72.8 ng/mL  CARDIAC MARKERS ( 29 Jun 2018 17:22 )  x     / 3.95 ng/mL / 1570 U/L / x     / 93.8 ng/mL      RADIOLOGY:    PHYSICAL EXAM:  GEN: No acute distress  LUNGS: Clear to auscultation bilaterally   HEART: S1/S2 present. RRR.   ABD/ GI: Soft, non-tender, non-distended. Bowel sounds present  EXT: NC/NC/NE/2+PP/LANDAVERDE  NEURO: AAOX3
SUBJECTIVE:    Patient is a 72y old Male who presents with a chief complaint of stemi (29 Jun 2018 01:11)    Stable this morning.    PAST MEDICAL & SURGICAL HISTORY  CAD (coronary artery disease): s/p pci  H/O heart artery stent: x 2  HLD (hyperlipidemia)  HTN (hypertension)    SOCIAL HISTORY:  Negative for smoking/alcohol/drug use.     ALLERGIES:  No Known Allergies    MEDICATIONS:  STANDING MEDICATIONS  aspirin  chewable 81 milliGRAM(s) Chew daily  atorvastatin 80 milliGRAM(s) Oral at bedtime  enoxaparin Injectable 40 milliGRAM(s) SubCutaneous every 24 hours  metoprolol tartrate 12.5 milliGRAM(s) Oral two times a day  sodium chloride 0.9%. 1000 milliLiter(s) IV Continuous <Continuous>    PRN MEDICATIONS    VITALS:   T(F): 97.7  HR: 56  BP: 116/-  RR: 16  SpO2: 97%    LABS:                        13.5   10.33 )-----------( 164      ( 30 Jun 2018 05:02 )             40.6     06-30    139  |  105  |  11  ----------------------------<  117<H>  4.7   |  23  |  0.9    Ca    8.6      30 Jun 2018 05:02  Mg     2.2     06-30    TPro  6.2  /  Alb  3.7  /  TBili  0.6  /  DBili  x   /  AST  98<H>  /  ALT  36  /  AlkPhos  47  06-30    PT/INR - ( 29 Jun 2018 05:11 )   PT: 12.00 sec;   INR: 1.11 ratio         PTT - ( 29 Jun 2018 05:11 )  PTT:31.9 sec      Creatine Kinase, Serum: 640 U/L <H> (06-30-18 @ 17:58)  Troponin T, Serum: 2.28 ng/mL <HH> (06-30-18 @ 17:58)  Creatine Kinase, Serum: 1101 U/L <H> (06-30-18 @ 05:02)  Troponin T, Serum: 3.02 ng/mL <HH> (06-30-18 @ 05:02)  Creatine Kinase, Serum: 1184 U/L <H> (06-30-18 @ 00:28)  Troponin T, Serum: 3.28 ng/mL <HH> (06-30-18 @ 00:28)      CARDIAC MARKERS ( 30 Jun 2018 17:58 )  x     / 2.28 ng/mL / 640 U/L / x     / 13.7 ng/mL  CARDIAC MARKERS ( 30 Jun 2018 05:02 )  x     / 3.02 ng/mL / 1101 U/L / x     / 39.9 ng/mL  CARDIAC MARKERS ( 30 Jun 2018 00:28 )  x     / 3.28 ng/mL / 1184 U/L / x     / 53.6 ng/mL  CARDIAC MARKERS ( 29 Jun 2018 20:07 )  x     / 3.75 ng/mL / 1386 U/L / x     / 72.8 ng/mL  CARDIAC MARKERS ( 29 Jun 2018 17:22 )  x     / 3.95 ng/mL / 1570 U/L / x     / 93.8 ng/mL  CARDIAC MARKERS ( 29 Jun 2018 05:11 )  x     / 5.71 ng/mL / x     / x     / 128.0 ng/mL  CARDIAC MARKERS ( 28 Jun 2018 22:31 )  x     / <0.01 ng/mL / 277 U/L / x     / 3.4 ng/mL          06-29-18 @ 07:01  -  06-30-18 @ 07:00  --------------------------------------------------------  IN: 960 mL / OUT: 600 mL / NET: 360 mL    06-30-18 @ 07:01  -  06-30-18 @ 21:45  --------------------------------------------------------  IN: 720 mL / OUT: 1100 mL / NET: -380 mL          PHYSICAL EXAM:  GEN: NAD, comfortable  LUNGS: CTAB, no w/r/r  HEART: RRR, no m/r/g  ABD: soft, NT/ND, +BS  EXT: no edema, PP b/l  NEURO: AAOx3
SUBJ:No chest pain or shortness of breath      MEDICATIONS  (STANDING):  aspirin  chewable 81 milliGRAM(s) Chew daily  atorvastatin 80 milliGRAM(s) Oral at bedtime  clopidogrel Tablet 75 milliGRAM(s) Oral daily  enoxaparin Injectable 40 milliGRAM(s) SubCutaneous every 24 hours  eptifibatide Infusion 75 mg/100 mL 2 MICROgram(s)/kG/Min (14.576 mL/Hr) IV Continuous <Continuous>  metoprolol tartrate 12.5 milliGRAM(s) Oral two times a day  sodium chloride 0.9%. 1000 milliLiter(s) (75 mL/Hr) IV Continuous <Continuous>    MEDICATIONS  (PRN):            Vital Signs Last 24 Hrs  T(C): 36.7 (29 Jun 2018 12:00), Max: 36.9 (28 Jun 2018 22:07)  T(F): 98 (29 Jun 2018 12:00), Max: 98.5 (28 Jun 2018 22:07)  HR: 58 (29 Jun 2018 14:00) (50 - 88)  BP: 121/67 (29 Jun 2018 14:00) (91/55 - 129/68)  BP(mean): 84 (29 Jun 2018 12:00) (69 - 90)  RR: 28 (29 Jun 2018 14:00) (18 - 29)  SpO2: 97% (29 Jun 2018 14:00) (93% - 99%)      ECG:NML    TTE:    LABS:                        12.8   11.23 )-----------( 163      ( 29 Jun 2018 05:11 )             38.7     06-29    136  |  103  |  22<H>  ----------------------------<  150<H>  4.8   |  21  |  0.7    Ca    8.3<L>      29 Jun 2018 05:11  Mg     2.3     06-29    TPro  6.8  /  Alb  4.3  /  TBili  0.2  /  DBili  x   /  AST  19  /  ALT  19  /  AlkPhos  60  06-28    CARDIAC MARKERS ( 29 Jun 2018 05:11 )  x     / 5.71 ng/mL / x     / x     / 128.0 ng/mL  CARDIAC MARKERS ( 28 Jun 2018 22:31 )  x     / <0.01 ng/mL / 277 U/L / x     / 3.4 ng/mL      PT/INR - ( 29 Jun 2018 05:11 )   PT: 12.00 sec;   INR: 1.11 ratio         PTT - ( 29 Jun 2018 05:11 )  PTT:31.9 sec    I&O's Summary    28 Jun 2018 07:01  -  29 Jun 2018 07:00  --------------------------------------------------------  IN: 493.5 mL / OUT: 300 mL / NET: 193.5 mL    29 Jun 2018 07:01  -  29 Jun 2018 15:44  --------------------------------------------------------  IN: 600 mL / OUT: 600 mL / NET: 0 mL      BNPSerum Pro-Brain Natriuretic Peptide: 41 pg/mL (06-28 @ 22:31)

## 2018-07-02 NOTE — DISCHARGE NOTE ADULT - CARE PLAN
Principal Discharge DX:	STEMI (ST elevation myocardial infarction)  Goal:	s/p stent RCA, Medical management, prevent future attacks  Assessment and plan of treatment:	You were found to have a blockage in one of the blood vessels supplying heart, s/p stenting of artery, continue with aspirin, brilinta, Beta blocjer, Lipitor and follow up with cardiologist in 2 weeks.  Secondary Diagnosis:	HLD (hyperlipidemia)  Goal:	Medical management  Assessment and plan of treatment:	follow up with your PMD in 2 weeks

## 2018-07-02 NOTE — DISCHARGE NOTE ADULT - PLAN OF CARE
s/p stent RCA, Medical management, prevent future attacks You were found to have a blockage in one of the blood vessels supplying heart, s/p stenting of artery, continue with aspirin, brilinta, Beta blocjer, Lipitor and follow up with cardiologist in 2 weeks. Medical management follow up with your PMD in 2 weeks

## 2018-07-02 NOTE — DISCHARGE NOTE ADULT - MEDICATION SUMMARY - MEDICATIONS TO STOP TAKING
I will STOP taking the medications listed below when I get home from the hospital:    rosuvastatin 20 mg oral tablet  -- 1 tab(s) by mouth once a day (at bedtime)    enalapril 20 mg oral tablet  -- 1 tab(s) by mouth once a day    Plavix 75 mg oral tablet  -- 1 tab(s) by mouth once a day    Plavix 75 mg oral tablet  -- 1 tab(s) by mouth once a day

## 2018-07-02 NOTE — DISCHARGE NOTE ADULT - PATIENT PORTAL LINK FT
You can access the [facility] Portal, offered by [facility], by registering with the following website: [email or web address]

## 2018-07-02 NOTE — PROGRESS NOTE ADULT - ATTENDING COMMENTS
pt s/p stemi, stent rca w/o cp,sob. minitor in ccu. continue antiplatelet.
pt to follow on meds. f/u 2w or sooner prn.
pt w/o cp, sob. monitor. consider brilinta.

## 2018-07-02 NOTE — DISCHARGE NOTE ADULT - MEDICATION SUMMARY - MEDICATIONS TO TAKE
I will START or STAY ON the medications listed below when I get home from the hospital:    aspirin 81 mg oral delayed release tablet  -- 1 tab(s) by mouth once a day  -- Indication: For CAD (coronary artery disease)    atorvastatin 80 mg oral tablet  -- 1 tab(s) by mouth once a day (at bedtime)  -- Indication: For CAD (coronary artery disease)    ticagrelor 90 mg oral tablet  -- 1 tab(s) by mouth 2 times a day  -- Indication: For CAD (coronary artery disease)    metoprolol tartrate 25 mg oral tablet  -- 0.5 tab(s) by mouth 2 times a day   -- It is very important that you take or use this exactly as directed.  Do not skip doses or discontinue unless directed by your doctor.  May cause drowsiness.  Alcohol may intensify this effect.  Use care when operating dangerous machinery.  Some non-prescription drugs may aggravate your condition.  Read all labels carefully.  If a warning appears, check with your doctor before taking.  Take with food or milk.  This drug may impair the ability to drive or operate machinery.  Use care until you become familiar with its effects.    -- Indication: For CAD (coronary artery disease)

## 2018-07-02 NOTE — PROGRESS NOTE ADULT - ASSESSMENT
72 yr old male with PMH of CAD/PCI, HTN, DLD p/w left sided chest pressure which started yesterday evening around 7 pm, associated with dyspnea and diaphoresis. Pain was constant and pt presented to ED. All other ROS negative. In ED, STEMI code was called and pt underwent emergent cardiac cath, s/p PCI of RCA.     # STEMI- ON aspirin and lipitor and brilinta. s/o PCI to RCA    # Bradycardia-metoprolol restarted at low dose.  DC home today

## 2018-07-02 NOTE — DISCHARGE NOTE ADULT - CARE PROVIDER_API CALL
Luis Martel), Cardiology; Cardiovascular Disease; Internal Medicine; Interventional Cardiology  [address] [place] [address]  Phone: [phone]  Fax: [phone]    esvin dempsey  Phone: (   )    -  Fax: (   )    -

## 2018-07-09 DIAGNOSIS — I10 ESSENTIAL (PRIMARY) HYPERTENSION: ICD-10-CM

## 2018-07-09 DIAGNOSIS — I21.11 ST ELEVATION (STEMI) MYOCARDIAL INFARCTION INVOLVING RIGHT CORONARY ARTERY: ICD-10-CM

## 2018-07-09 DIAGNOSIS — I97.88 OTHER INTRAOPERATIVE COMPLICATIONS OF THE CIRCULATORY SYSTEM, NOT ELSEWHERE CLASSIFIED: ICD-10-CM

## 2018-07-09 DIAGNOSIS — I25.2 OLD MYOCARDIAL INFARCTION: ICD-10-CM

## 2018-07-09 DIAGNOSIS — I49.01 VENTRICULAR FIBRILLATION: ICD-10-CM

## 2018-07-09 DIAGNOSIS — Z95.5 PRESENCE OF CORONARY ANGIOPLASTY IMPLANT AND GRAFT: ICD-10-CM

## 2018-07-09 DIAGNOSIS — I95.89 OTHER HYPOTENSION: ICD-10-CM

## 2018-07-09 DIAGNOSIS — R00.1 BRADYCARDIA, UNSPECIFIED: ICD-10-CM

## 2018-07-09 DIAGNOSIS — E78.5 HYPERLIPIDEMIA, UNSPECIFIED: ICD-10-CM

## 2018-07-09 DIAGNOSIS — I25.119 ATHEROSCLEROTIC HEART DISEASE OF NATIVE CORONARY ARTERY WITH UNSPECIFIED ANGINA PECTORIS: ICD-10-CM

## 2018-08-20 ENCOUNTER — OUTPATIENT (OUTPATIENT)
Dept: OUTPATIENT SERVICES | Facility: HOSPITAL | Age: 73
LOS: 1 days | Discharge: HOME | End: 2018-08-20

## 2018-08-20 DIAGNOSIS — I25.10 ATHEROSCLEROTIC HEART DISEASE OF NATIVE CORONARY ARTERY WITHOUT ANGINA PECTORIS: ICD-10-CM

## 2018-08-20 PROBLEM — I10 ESSENTIAL (PRIMARY) HYPERTENSION: Chronic | Status: ACTIVE | Noted: 2018-06-28

## 2018-08-20 PROBLEM — E78.5 HYPERLIPIDEMIA, UNSPECIFIED: Chronic | Status: ACTIVE | Noted: 2018-06-28

## 2018-08-20 PROBLEM — Z95.5 PRESENCE OF CORONARY ANGIOPLASTY IMPLANT AND GRAFT: Chronic | Status: ACTIVE | Noted: 2018-06-28

## 2018-11-08 ENCOUNTER — OUTPATIENT (OUTPATIENT)
Dept: OUTPATIENT SERVICES | Facility: HOSPITAL | Age: 73
LOS: 1 days | Discharge: HOME | End: 2018-11-08

## 2018-11-08 DIAGNOSIS — I10 ESSENTIAL (PRIMARY) HYPERTENSION: ICD-10-CM

## 2018-11-08 DIAGNOSIS — I25.10 ATHEROSCLEROTIC HEART DISEASE OF NATIVE CORONARY ARTERY WITHOUT ANGINA PECTORIS: ICD-10-CM

## 2018-11-08 DIAGNOSIS — E78.5 HYPERLIPIDEMIA, UNSPECIFIED: ICD-10-CM

## 2019-02-04 ENCOUNTER — OUTPATIENT (OUTPATIENT)
Dept: OUTPATIENT SERVICES | Facility: HOSPITAL | Age: 74
LOS: 1 days | Discharge: HOME | End: 2019-02-04

## 2019-02-04 DIAGNOSIS — I25.10 ATHEROSCLEROTIC HEART DISEASE OF NATIVE CORONARY ARTERY WITHOUT ANGINA PECTORIS: ICD-10-CM

## 2019-02-04 DIAGNOSIS — E78.5 HYPERLIPIDEMIA, UNSPECIFIED: ICD-10-CM

## 2019-02-04 DIAGNOSIS — I10 ESSENTIAL (PRIMARY) HYPERTENSION: ICD-10-CM

## 2019-03-04 ENCOUNTER — INPATIENT (INPATIENT)
Facility: HOSPITAL | Age: 74
LOS: 2 days | Discharge: HOME | End: 2019-03-07
Attending: INTERNAL MEDICINE | Admitting: INTERNAL MEDICINE
Payer: MEDICARE

## 2019-03-04 VITALS
HEART RATE: 67 BPM | OXYGEN SATURATION: 97 % | RESPIRATION RATE: 20 BRPM | DIASTOLIC BLOOD PRESSURE: 90 MMHG | TEMPERATURE: 96 F | SYSTOLIC BLOOD PRESSURE: 197 MMHG

## 2019-03-04 DIAGNOSIS — Z95.5 PRESENCE OF CORONARY ANGIOPLASTY IMPLANT AND GRAFT: Chronic | ICD-10-CM

## 2019-03-04 LAB
ALBUMIN SERPL ELPH-MCNC: 4.3 G/DL — SIGNIFICANT CHANGE UP (ref 3.5–5.2)
ALP SERPL-CCNC: 74 U/L — SIGNIFICANT CHANGE UP (ref 30–115)
ALT FLD-CCNC: 20 U/L — SIGNIFICANT CHANGE UP (ref 0–41)
ANION GAP SERPL CALC-SCNC: 13 MMOL/L — SIGNIFICANT CHANGE UP (ref 7–14)
APTT BLD: 33.7 SEC — SIGNIFICANT CHANGE UP (ref 27–39.2)
AST SERPL-CCNC: 19 U/L — SIGNIFICANT CHANGE UP (ref 0–41)
BASOPHILS # BLD AUTO: 0.04 K/UL — SIGNIFICANT CHANGE UP (ref 0–0.2)
BASOPHILS NFR BLD AUTO: 0.4 % — SIGNIFICANT CHANGE UP (ref 0–1)
BILIRUB SERPL-MCNC: 0.4 MG/DL — SIGNIFICANT CHANGE UP (ref 0.2–1.2)
BUN SERPL-MCNC: 13 MG/DL — SIGNIFICANT CHANGE UP (ref 10–20)
CALCIUM SERPL-MCNC: 9.2 MG/DL — SIGNIFICANT CHANGE UP (ref 8.5–10.1)
CHLORIDE SERPL-SCNC: 107 MMOL/L — SIGNIFICANT CHANGE UP (ref 98–110)
CK MB CFR SERPL CALC: 2.3 NG/ML — SIGNIFICANT CHANGE UP (ref 0.6–6.3)
CO2 SERPL-SCNC: 23 MMOL/L — SIGNIFICANT CHANGE UP (ref 17–32)
CREAT SERPL-MCNC: 0.9 MG/DL — SIGNIFICANT CHANGE UP (ref 0.7–1.5)
EOSINOPHIL # BLD AUTO: 0.14 K/UL — SIGNIFICANT CHANGE UP (ref 0–0.7)
EOSINOPHIL NFR BLD AUTO: 1.5 % — SIGNIFICANT CHANGE UP (ref 0–8)
GLUCOSE SERPL-MCNC: 99 MG/DL — SIGNIFICANT CHANGE UP (ref 70–99)
HCT VFR BLD CALC: 46.3 % — SIGNIFICANT CHANGE UP (ref 42–52)
HGB BLD-MCNC: 15.5 G/DL — SIGNIFICANT CHANGE UP (ref 14–18)
IMM GRANULOCYTES NFR BLD AUTO: 0.5 % — HIGH (ref 0.1–0.3)
INR BLD: 0.96 RATIO — SIGNIFICANT CHANGE UP (ref 0.65–1.3)
LYMPHOCYTES # BLD AUTO: 1.6 K/UL — SIGNIFICANT CHANGE UP (ref 1.2–3.4)
LYMPHOCYTES # BLD AUTO: 16.8 % — LOW (ref 20.5–51.1)
MCHC RBC-ENTMCNC: 29.9 PG — SIGNIFICANT CHANGE UP (ref 27–31)
MCHC RBC-ENTMCNC: 33.5 G/DL — SIGNIFICANT CHANGE UP (ref 32–37)
MCV RBC AUTO: 89.2 FL — SIGNIFICANT CHANGE UP (ref 80–94)
MONOCYTES # BLD AUTO: 0.82 K/UL — HIGH (ref 0.1–0.6)
MONOCYTES NFR BLD AUTO: 8.6 % — SIGNIFICANT CHANGE UP (ref 1.7–9.3)
NEUTROPHILS # BLD AUTO: 6.89 K/UL — HIGH (ref 1.4–6.5)
NEUTROPHILS NFR BLD AUTO: 72.2 % — SIGNIFICANT CHANGE UP (ref 42.2–75.2)
NRBC # BLD: 0 /100 WBCS — SIGNIFICANT CHANGE UP (ref 0–0)
PLATELET # BLD AUTO: 181 K/UL — SIGNIFICANT CHANGE UP (ref 130–400)
POTASSIUM SERPL-MCNC: 4.5 MMOL/L — SIGNIFICANT CHANGE UP (ref 3.5–5)
POTASSIUM SERPL-SCNC: 4.5 MMOL/L — SIGNIFICANT CHANGE UP (ref 3.5–5)
PROT SERPL-MCNC: 6.9 G/DL — SIGNIFICANT CHANGE UP (ref 6–8)
PROTHROM AB SERPL-ACNC: 11.1 SEC — SIGNIFICANT CHANGE UP (ref 9.95–12.87)
RBC # BLD: 5.19 M/UL — SIGNIFICANT CHANGE UP (ref 4.7–6.1)
RBC # FLD: 12.9 % — SIGNIFICANT CHANGE UP (ref 11.5–14.5)
SODIUM SERPL-SCNC: 143 MMOL/L — SIGNIFICANT CHANGE UP (ref 135–146)
TROPONIN T SERPL-MCNC: <0.01 NG/ML — SIGNIFICANT CHANGE UP
TROPONIN T SERPL-MCNC: <0.01 NG/ML — SIGNIFICANT CHANGE UP
WBC # BLD: 9.54 K/UL — SIGNIFICANT CHANGE UP (ref 4.8–10.8)
WBC # FLD AUTO: 9.54 K/UL — SIGNIFICANT CHANGE UP (ref 4.8–10.8)

## 2019-03-04 RX ORDER — METOPROLOL TARTRATE 50 MG
25 TABLET ORAL DAILY
Qty: 0 | Refills: 0 | Status: DISCONTINUED | OUTPATIENT
Start: 2019-03-04 | End: 2019-03-07

## 2019-03-04 RX ORDER — ASPIRIN/CALCIUM CARB/MAGNESIUM 324 MG
325 TABLET ORAL ONCE
Qty: 0 | Refills: 0 | Status: DISCONTINUED | OUTPATIENT
Start: 2019-03-04 | End: 2019-03-04

## 2019-03-04 RX ORDER — LOSARTAN POTASSIUM 100 MG/1
50 TABLET, FILM COATED ORAL DAILY
Qty: 0 | Refills: 0 | Status: DISCONTINUED | OUTPATIENT
Start: 2019-03-04 | End: 2019-03-05

## 2019-03-04 RX ORDER — ATORVASTATIN CALCIUM 80 MG/1
80 TABLET, FILM COATED ORAL AT BEDTIME
Qty: 0 | Refills: 0 | Status: DISCONTINUED | OUTPATIENT
Start: 2019-03-04 | End: 2019-03-07

## 2019-03-04 RX ORDER — TICAGRELOR 90 MG/1
90 TABLET ORAL
Qty: 0 | Refills: 0 | Status: DISCONTINUED | OUTPATIENT
Start: 2019-03-04 | End: 2019-03-07

## 2019-03-04 RX ORDER — ASPIRIN/CALCIUM CARB/MAGNESIUM 324 MG
81 TABLET ORAL DAILY
Qty: 0 | Refills: 0 | Status: DISCONTINUED | OUTPATIENT
Start: 2019-03-04 | End: 2019-03-07

## 2019-03-04 RX ADMIN — TICAGRELOR 90 MILLIGRAM(S): 90 TABLET ORAL at 21:30

## 2019-03-04 RX ADMIN — ATORVASTATIN CALCIUM 80 MILLIGRAM(S): 80 TABLET, FILM COATED ORAL at 21:31

## 2019-03-04 RX ADMIN — LOSARTAN POTASSIUM 50 MILLIGRAM(S): 100 TABLET, FILM COATED ORAL at 19:04

## 2019-03-04 RX ADMIN — Medication 81 MILLIGRAM(S): at 19:05

## 2019-03-04 NOTE — ED PROVIDER NOTE - NS ED ROS FT
Constitutional: no fever, chills, no recent weight loss, change in appetite or malaise  Eyes: no redness/discharge/pain/vision changes  ENT: + nose bleed. no rhinorrhea/ear pain/sore throat  Cardiac: see HPI  Respiratory: No cough or respiratory distress  GI: No nausea, vomiting, diarrhea or abdominal pain.  : No dysuria, frequency, urgency or hematuria  MS: no pain to back or extremities, no loss of ROM, no weakness  Neuro: No headache or weakness. No LOC.  Skin: No skin rash.  Endocrine: No history of thyroid disease or diabetes.  Except as documented in the HPI, all other systems are negative.

## 2019-03-04 NOTE — H&P ADULT - ATTENDING COMMENTS
72 yr old male presented with vague c/o chest pain. he also c/o bleeding from nose.  VITAL SIGNS (Last 24 hrs):  T(C): 35.8 (03-05-19 @ 13:44), Max: 35.8 (03-04-19 @ 16:11)  HR: 62 (03-05-19 @ 13:44) (60 - 62)  BP: 156/94 (03-05-19 @ 13:44) (133/80 - 183/84)  RR: 18 (03-05-19 @ 13:44) (18 - 18)  SpO2: 97% (03-04-19 @ 16:11) (97% - 97%)  Wt(kg): --  Daily     Daily     I&O's Summary    05 Mar 2019 07:01  -  05 Mar 2019 15:00  --------------------------------------------------------  IN: 390 mL / OUT: 0 mL / NET: 390 mL                          15.7   9.47  )-----------( 192      ( 05 Mar 2019 08:11 )             46.6   03-05    144  |  106  |  13  ----------------------------<  121<H>  4.5   |  23  |  1.0    Ca    9.2      05 Mar 2019 08:11    TPro  6.6  /  Alb  4.0  /  TBili  0.7  /  DBili  x   /  AST  18  /  ALT  20  /  AlkPhos  68  03-05  ekg- NSR rate 67/min with q waves in lead 111 and AVF.  o/e  aoox3  chest-b/l air entry  cvs-s1s2n  abd/gi-soft, bs+   no edema  ASSESSMENT AND PLAN:  # CHEST PAIN  likely costochondritis 72 yr old male presented with vague c/o chest pain. he also c/o bleeding from nose.  VITAL SIGNS (Last 24 hrs):  T(C): 35.8 (03-05-19 @ 13:44), Max: 35.8 (03-04-19 @ 16:11)  HR: 62 (03-05-19 @ 13:44) (60 - 62)  BP: 156/94 (03-05-19 @ 13:44) (133/80 - 183/84)  RR: 18 (03-05-19 @ 13:44) (18 - 18)  SpO2: 97% (03-04-19 @ 16:11) (97% - 97%)  Wt(kg): --  Daily     Daily     I&O's Summary    05 Mar 2019 07:01  -  05 Mar 2019 15:00  --------------------------------------------------------  IN: 390 mL / OUT: 0 mL / NET: 390 mL                          15.7   9.47  )-----------( 192      ( 05 Mar 2019 08:11 )             46.6   03-05    144  |  106  |  13  ----------------------------<  121<H>  4.5   |  23  |  1.0    Ca    9.2      05 Mar 2019 08:11    TPro  6.6  /  Alb  4.0  /  TBili  0.7  /  DBili  x   /  AST  18  /  ALT  20  /  AlkPhos  68  03-05  ekg- NSR rate 67/min with q waves in lead 111 and AVF.  o/e  aoox3  chest-b/l air entry  cvs-s1s2n  abd/gi-soft, bs+   no edema  ASSESSMENT AND PLAN:  # r/o ACS-- pain mostly in epigastrium which is no longer present. continue metoprolol, aspirin and brillinta. awaiting cardiology Dr Martel.  #hypertensive urgency-- BP high-- will increase  losartan to 100mg if BP remains high.  #hx of nose bleed could be sec to high BP and dryness in nose. 72 yr old male presented with vague c/o chest pain. he also c/o bleeding from nose.  VITAL SIGNS (Last 24 hrs):  T(C): 35.8 (03-05-19 @ 13:44), Max: 35.8 (03-04-19 @ 16:11)  HR: 62 (03-05-19 @ 13:44) (60 - 62)  BP: 156/94 (03-05-19 @ 13:44) (133/80 - 183/84)  RR: 18 (03-05-19 @ 13:44) (18 - 18)  SpO2: 97% (03-04-19 @ 16:11) (97% - 97%)  Wt(kg): --  Daily     Daily     I&O's Summary    05 Mar 2019 07:01  -  05 Mar 2019 15:00  --------------------------------------------------------  IN: 390 mL / OUT: 0 mL / NET: 390 mL                          15.7   9.47  )-----------( 192      ( 05 Mar 2019 08:11 )             46.6   03-05    144  |  106  |  13  ----------------------------<  121<H>  4.5   |  23  |  1.0    Ca    9.2      05 Mar 2019 08:11    TPro  6.6  /  Alb  4.0  /  TBili  0.7  /  DBili  x   /  AST  18  /  ALT  20  /  AlkPhos  68  03-05  ekg- NSR rate 67/min with q waves in lead 111 and AVF. TWI slight in v5 and v6.  o/e  aoox3  chest-b/l air entry  cvs-s1s2n  abd/gi-soft, bs+   no edema  ASSESSMENT AND PLAN:  # r/o ACS-- pain mostly in epigastrium which is no longer present. continue metoprolol, aspirin and brillinta. awaiting cardiology Dr Martel.  known hx of CAD s/p PCI  #hypertensive urgency-- BP high-- will increase  losartan to 100mg if BP remains high.  #hx of nose bleed could be sec to high BP and dryness in nose.

## 2019-03-04 NOTE — ED PROVIDER NOTE - CADM POA URETHRAL CATHETER
Final Diagnosis:  Pregnancy at 40w5d delivered     Reason for Hospitalization: medical induction of labor    Significant Findings:  Liveborn Spencer     Complications: Yes nonreassuring fetal heart tones.    HGB (g/dL)   Date Value   2017 9.8 (L)       HCT (%)   Date Value   2017 30.2 (L)        Procedures Performed: primary  section, low transverse incision    Condition on Discharge:  Recovering 41 year old female     Information for the patient's :  Elie, Cristiane Wilde [[id number]]     LABOR:            Labor Times:  Labor onset date: 2017 Labor onset time: 4:15 PM   Dilation complete date: 2017 Dilation complete time: 5:09 PM          Labor Length:  1st Stage 0 hr 54 min 2nd Stage 0 hr 34 min 3rd Stage 0 hr 1 min            Events of Labor:   labor?: No   Maternal risk factors:     Rupture date & time: 2017 4:15 PM   Rupture type: Artificial   Fluid color: Clear   Induction: Oxytocin;AROM   Reasons for induction:     Augmentation:     Intrapartum medications:     Complications:     Cervical ripening:            DELIVERY:    Episiotomy: None   Lacerations: None   Sponge count:     Comments:     Repair suture: None     Type of sutures:     Blood loss (mL): 700   Intrapartum procedures:     Maternal complications:     Placenta date & time: 2017  5:44 PM   Placenta removal: Manual Removal   Placenta appearance: Intact   Placenta / cord comment:     Cord vessels: 3 Vessels   Complications: None   Nuchal cord:     Delivery complications:     Indications for operative vaginal delivery:     Events of Labor and Delivery:     Presentation: Vertex   Position:   Occiput Posterior           Anesthesia:        Method: Epidural        Analgesic:            Delivery Personnel:          Delivering clinician: ANAND SHORE []        Other personnel: LUIS GIBSON [[id number]];NIKOLAY MARCELO [[id number]];ALMA CUNNINGHAM [[id number]];EMILY FRITSCH [[id number]];FUNMI VALE [[id number]];MARIANNA  L MACKERLEY [[id number]]              Sex:  female Gestational Age: 40w5d    Delivery date & time: 2017 5:43 PM   Place of birth:     Delivery type:     Delivery method: , Low Transverse   Forceps:     Vacuum:     Type of Vacuum:     Breech:     Dystocia Maneuvers used:     Case classification     Indications for operative  delivery:     Cord pH:               Resuscitation:        Method: Oxygen;Suctioning        See resuscitation record:            Measurements:        Weight: 8 lb 9.2 oz (3890 g)        Length: 20\"        Head circumference: 36 cm          Observed Anomalies:           Assessment:        Living?:            APGARS  One minute Five minutes Ten minutes   Skin color:         Heart rate:         Grimace:         Muscle tone:         Breathing:         Totals: 5  7  9      Juancarlos Nelson MD        Discharge Instructions: Given to patient.      Follow up with Dr. Bill in 6 weeks.                 No

## 2019-03-04 NOTE — H&P ADULT - NSHPLABSRESULTS_GEN_ALL_CORE
15.5   9.54   )----------(  181       ( 04 Mar 2019 12:26 )               46.3    03-04    143  |  107  |  13  ----------------------------<  99  4.5   |  23  |  0.9    Ca    9.2      04 Mar 2019 12:26    TPro  6.9  /  Alb  4.3  /  TBili  0.4  /  DBili  x   /  AST  19  /  ALT  20  /  AlkPhos  74  03-04    CARDIAC MARKERS ( 04 Mar 2019 12:26 )  x     / <0.01 ng/mL / x     / x     / x

## 2019-03-04 NOTE — ED ADULT NURSE NOTE - OBJECTIVE STATEMENT
Pt a&ox3, Icelandic speaking, dtr at bedside to translate. Pt c/o nose bleeding with clots x2 this morning, states when he blows his nose his nose bleeds, no active bleeding at this time pt on low dose ASA. Pt c/o substernal CP since 7am rated 5/10, states he also has some SOB. Pt denies fever/chills, denies abd pain denies n/v/d. No LE swelling.

## 2019-03-04 NOTE — ED ADULT TRIAGE NOTE - CHIEF COMPLAINT QUOTE
pt on aspirin complaining of nose bleed, pt reports clots every time he blows his nose. pt also complaining of chest pain  x 2 hours

## 2019-03-04 NOTE — ED PROVIDER NOTE - CLINICAL SUMMARY MEDICAL DECISION MAKING FREE TEXT BOX
73yM HTN DLD CAD w/ prior MI/PCI p/w subxyphoid chest pain x 1d.  No radiation or associated SOB/n/v/diaphoresis. +headache and epistaxis (resolved prior to arrival).  BP elevated on arrival. EKG w/ old inferior TWI and new lateral TWI. Trop neg x 1. Pt admitted to tele for monitoring, serial troponins and further risk stratification.

## 2019-03-04 NOTE — H&P ADULT - HISTORY OF PRESENT ILLNESS
Patient is a 71 yo M with PMHx of CAD s/p PCI * 2, stent to RCA in June 2018, HTN, DLD presenting from home today with complaints of some "chest pain" and bleeding from his mouth and lips. Per patient, he says he had some chest pain prior to admission. When asked to describe the pain (heavy pressure or sharp pain both asked specifically) he only reports that "it feels as if blood is in his throat." When asked to point to the area of pain, he points to the epigastrium. He says that his chest pain was NOT similar to his chest pain in the past which required him to have stents. He is more concerned in regards to the bleeding from nose and his lips. He had two episodes, one in the AM while in the shower and one at night Prior to admission. Associated symptoms include a headache. He denies any fevers, chills, nausea, vomiting, or hematemesis. He says that he did have some bleeding from the same sites about 2 months ago which lasted about one week but was a smaller quantity of blood. He is unable to quantify exactly how much blood he lost but knows that it was more this time. He last saw his cardiologist on Feb 20th, 2019 and has a followed up schedule for March 20th, 2019. EKG with some noted TWI in lateral leads on admission. At time of interview, patient denying any active chest pain and reports that he has not had any bleeding since the AM of admission. First set of cardiac enzymes is negative.

## 2019-03-04 NOTE — ED ADULT NURSE NOTE - NSIMPLEMENTINTERV_GEN_ALL_ED
Implemented All Universal Safety Interventions:  [place] to call system. Call bell, personal items and telephone within reach. Instruct patient to call for assistance. Room bathroom lighting operational. Non-slip footwear when patient is off stretcher. Physically safe environment: no spills, clutter or unnecessary equipment. Stretcher in lowest position, wheels locked, appropriate side rails in place.

## 2019-03-04 NOTE — ED PROVIDER NOTE - PHYSICAL EXAMINATION
CONSTITUTIONAL: Well-appearing; well-nourished; in no apparent distress.   EYES: PERRL; EOM intact.   ENT: + dried blood to left nostril. no rhinorrhea; normal pharynx with no tonsillar hypertrophy.   CARDIOVASCULAR: Normal S1, S2; no murmurs, rubs, or gallops.   RESPIRATORY: Normal chest excursion with respiration; breath sounds clear and equal bilaterally; no wheezes, rhonchi, or rales.  GI/: Normal bowel sounds; non-distended; non-tender; no palpable organomegaly.   MS: No evidence of trauma or deformity.   SKIN: Normal for age and race; warm; dry; good turgor; no apparent lesions or exudate.   NEURO/PSYCH: A & O x 4; grossly unremarkable.

## 2019-03-04 NOTE — ED PROVIDER NOTE - OBJECTIVE STATEMENT
72 yo male hx of HTN/HLD/CAD s/p MI x 3 with Stent x 2 present chest pressure started around 7 am. pressure radiating to his back. Denies SOB/weakness and diaphoresis assoc with the chest pain. also reported bleeding from his left nostril at 6 am this morning and improved after compression. noted off/on blood clot after blowing but no more active bleeding in the past few hours.  Denies fever/chill/HA/dizziness/palpitation/sob/abd pain/n/v/d/ black stool/bloody stool/urinary sxs

## 2019-03-04 NOTE — H&P ADULT - NSHPPHYSICALEXAM_GEN_ALL_CORE
T(C): 35.4 (04 Mar 2019 17:49), Max: 35.9 (04 Mar 2019 15:00)  T(F): 95.8 (04 Mar 2019 17:49), Max: 96.7 (04 Mar 2019 15:00)  HR: 60 (04 Mar 2019 17:49) (57 - 67)  BP: 183/84 (04 Mar 2019 17:49) (145/83 - 197/90)  RR: 18 (04 Mar 2019 17:49) (18 - 20)  SpO2: 97% (04 Mar 2019 16:11) (97% - 97%)    General: well developed, well nourished, NAD  Neuro: alert and oriented, no focal deficits, moves all extremities spontaneously  HEENT: NCAT, EOMI, anicteric, mucosa moist  Respiratory: CTABL  CVS: regular rate and rhythm  Abdomen: soft, nontender, nondistended  Extremities: no edema, sensation and movement grossly intact  Skin: warm, dry, appropriate color

## 2019-03-04 NOTE — H&P ADULT - ASSESSMENT
Patient is a 73 yo M with PMHx of CAD s/p PCI * 2, stent to RCA in June 2018, HTN, DLD presenting from home today with complaints of some "chest pain" and bleeding from his mouth and lips.     #) Epistaxis with oral bleeding  - Patient reporting bleeding from oral cavity and nose in AM of admission  - No bleeding visible on admission as reported by both ED and myself  - Consider ENT consult for fiberoptic examination if bleeding recurs  - Will cont with ASA and Brillinta for now, give stent placement in June  - Goal to achieve more adequate blood pressure, please adjust medications accordingly     #) Chest Pain, resolved in a patient with CAD s/p stents   - Some TWI inversions noted on EKG  - Patient with no chest pain on admission, Troponin negative x 1  - Follow up subsequent troponins   - Cardiology evaluation, Dr. Martel  - 2D Echo  - Tele monitoring     #) CAD s/p stents  - Cont with Toprol xL, Losartan, and statin   - Cont ASA and Brillinta for now     #Diet: DASH  #Activity: OOBTC  #DVT Ppx: Lovenox   #GI PPx: Not indicated   #Dispo Pending

## 2019-03-04 NOTE — ED PROVIDER NOTE - ATTENDING CONTRIBUTION TO CARE
73yM CAD s/p MI x 3 (most recently in June 2018) with known additional untreated vessel disease p/w headache, epistaxis and substernal chest pain.  Pain developed today - unclear if CP is worse w/ exertion or eating breakfast.  Was recently switched to new BP med and daughter reports BP has been up and down since, though today is higher than prior.  Reports that this CP is different from his prior MIs.    PMH: HTN CAD  Meds: includes aspirin, plavix, metoprolol  NKDA  former smoker    VS notable for /90  CONSTITUTIONAL: well developed; well nourished; well appearing in no acute distress  HEAD: normocephalic; atraumatic  EYES: no conjunctival injection, no scleral icterus  ENT: no nasal discharge or epistaxis; airway clear.  NECK: supple; non tender. + full passive ROM in all directions  CARD: S1, S2 normal; no murmurs, gallops, or rubs. Regular rate and rhythm  RESP: no wheezes, rales or rhonchi. Good air movement bilaterally without significant accessory muscle use  ABD: soft; non-distended; non-tender. No rebound, no guarding, no pulsatile abdominal mass  EXT: moving all extremities spontaneously, normal ROM. No clubbing, cyanosis or edema  SKIN: warm and dry, no lesions noted  NEURO: alert, oriented, CN II-XII grossly intact, motor and sensory grossly intact, speech nonslurred, no focal deficits. GCS 15  PSYCH: calm, cooperative, appropriate, good eye contact, logical thought process, no apparent danger to self or others    pt well appearing in the ED w/o current epistaxis  unclear what caused epistaxis - whether environmental (cold/dry air etc), less concerned for BP causing epistaxis and headache as his pressures have been labile before w/o sx  EKG with TWI in high lateral leads  labs  BP control  anticipate admission given known CAD 73yM CAD s/p MI x 3 (most recently in June 2018) with known additional untreated vessel disease p/w headache, epistaxis and substernal chest pain.  Pain developed today - unclear if CP is worse w/ exertion or eating breakfast.  Was recently switched to new BP med and daughter reports BP has been up and down since, though today is higher than prior.  Reports that this CP is different from his prior MIs.    PMH: HTN CAD  Meds: includes aspirin, plavix, metoprolol  NKDA  former smoker    VS notable for /90  CONSTITUTIONAL: well developed; well nourished; well appearing in no acute distress  HEAD: normocephalic; atraumatic  EYES: no conjunctival injection, no scleral icterus  ENT: no nasal discharge or epistaxis; airway clear.  NECK: supple; non tender. + full passive ROM in all directions  CARD: S1, S2 normal; no murmurs, gallops, or rubs. Regular rate and rhythm  RESP: no wheezes, rales or rhonchi. Good air movement bilaterally without significant accessory muscle use  ABD: soft; non-distended; non-tender. No rebound, no guarding, no pulsatile abdominal mass  EXT: moving all extremities spontaneously, normal ROM. No clubbing, cyanosis or edema  SKIN: warm and dry, no lesions noted  NEURO: alert, oriented, CN II-XII grossly intact, motor and sensory grossly intact, speech nonslurred, no focal deficits. GCS 15  PSYCH: calm, cooperative, appropriate, good eye contact, logical thought process, no apparent danger to self or others    pt well appearing in the ED w/o current epistaxis  unclear what caused epistaxis - whether environmental (cold/dry air etc), less concerned for BP causing epistaxis and headache as his pressures have been labile before w/o sx  EKG with TWI in high lateral leads (new compared to prior) and inferior leads (old)  labs  BP control  anticipate admission given known CAD

## 2019-03-05 LAB
ALBUMIN SERPL ELPH-MCNC: 4 G/DL — SIGNIFICANT CHANGE UP (ref 3.5–5.2)
ALP SERPL-CCNC: 68 U/L — SIGNIFICANT CHANGE UP (ref 30–115)
ALT FLD-CCNC: 20 U/L — SIGNIFICANT CHANGE UP (ref 0–41)
ANION GAP SERPL CALC-SCNC: 15 MMOL/L — HIGH (ref 7–14)
AST SERPL-CCNC: 18 U/L — SIGNIFICANT CHANGE UP (ref 0–41)
BASOPHILS # BLD AUTO: 0.04 K/UL — SIGNIFICANT CHANGE UP (ref 0–0.2)
BASOPHILS NFR BLD AUTO: 0.4 % — SIGNIFICANT CHANGE UP (ref 0–1)
BILIRUB SERPL-MCNC: 0.7 MG/DL — SIGNIFICANT CHANGE UP (ref 0.2–1.2)
BLD GP AB SCN SERPL QL: SIGNIFICANT CHANGE UP
BUN SERPL-MCNC: 13 MG/DL — SIGNIFICANT CHANGE UP (ref 10–20)
CALCIUM SERPL-MCNC: 9.2 MG/DL — SIGNIFICANT CHANGE UP (ref 8.5–10.1)
CHLORIDE SERPL-SCNC: 106 MMOL/L — SIGNIFICANT CHANGE UP (ref 98–110)
CK MB CFR SERPL CALC: 2.2 NG/ML — SIGNIFICANT CHANGE UP (ref 0.6–6.3)
CK SERPL-CCNC: 136 U/L — SIGNIFICANT CHANGE UP (ref 0–225)
CK SERPL-CCNC: 148 U/L — SIGNIFICANT CHANGE UP (ref 0–225)
CO2 SERPL-SCNC: 23 MMOL/L — SIGNIFICANT CHANGE UP (ref 17–32)
CREAT SERPL-MCNC: 1 MG/DL — SIGNIFICANT CHANGE UP (ref 0.7–1.5)
EOSINOPHIL # BLD AUTO: 0.26 K/UL — SIGNIFICANT CHANGE UP (ref 0–0.7)
EOSINOPHIL NFR BLD AUTO: 2.7 % — SIGNIFICANT CHANGE UP (ref 0–8)
GLUCOSE SERPL-MCNC: 121 MG/DL — HIGH (ref 70–99)
HCT VFR BLD CALC: 46.6 % — SIGNIFICANT CHANGE UP (ref 42–52)
HCV AB S/CO SERPL IA: 0.22 S/CO — SIGNIFICANT CHANGE UP (ref 0–0.79)
HCV AB SERPL-IMP: SIGNIFICANT CHANGE UP
HGB BLD-MCNC: 15.7 G/DL — SIGNIFICANT CHANGE UP (ref 14–18)
IMM GRANULOCYTES NFR BLD AUTO: 0.5 % — HIGH (ref 0.1–0.3)
LYMPHOCYTES # BLD AUTO: 1.71 K/UL — SIGNIFICANT CHANGE UP (ref 1.2–3.4)
LYMPHOCYTES # BLD AUTO: 18.1 % — LOW (ref 20.5–51.1)
MCHC RBC-ENTMCNC: 30.3 PG — SIGNIFICANT CHANGE UP (ref 27–31)
MCHC RBC-ENTMCNC: 33.7 G/DL — SIGNIFICANT CHANGE UP (ref 32–37)
MCV RBC AUTO: 89.8 FL — SIGNIFICANT CHANGE UP (ref 80–94)
MONOCYTES # BLD AUTO: 0.95 K/UL — HIGH (ref 0.1–0.6)
MONOCYTES NFR BLD AUTO: 10 % — HIGH (ref 1.7–9.3)
NEUTROPHILS # BLD AUTO: 6.46 K/UL — SIGNIFICANT CHANGE UP (ref 1.4–6.5)
NEUTROPHILS NFR BLD AUTO: 68.3 % — SIGNIFICANT CHANGE UP (ref 42.2–75.2)
NRBC # BLD: 0 /100 WBCS — SIGNIFICANT CHANGE UP (ref 0–0)
PLATELET # BLD AUTO: 192 K/UL — SIGNIFICANT CHANGE UP (ref 130–400)
POTASSIUM SERPL-MCNC: 4.5 MMOL/L — SIGNIFICANT CHANGE UP (ref 3.5–5)
POTASSIUM SERPL-SCNC: 4.5 MMOL/L — SIGNIFICANT CHANGE UP (ref 3.5–5)
PROT SERPL-MCNC: 6.6 G/DL — SIGNIFICANT CHANGE UP (ref 6–8)
RBC # BLD: 5.19 M/UL — SIGNIFICANT CHANGE UP (ref 4.7–6.1)
RBC # FLD: 13.2 % — SIGNIFICANT CHANGE UP (ref 11.5–14.5)
SODIUM SERPL-SCNC: 144 MMOL/L — SIGNIFICANT CHANGE UP (ref 135–146)
TROPONIN T SERPL-MCNC: <0.01 NG/ML — SIGNIFICANT CHANGE UP
TYPE + AB SCN PNL BLD: SIGNIFICANT CHANGE UP
WBC # BLD: 9.47 K/UL — SIGNIFICANT CHANGE UP (ref 4.8–10.8)
WBC # FLD AUTO: 9.47 K/UL — SIGNIFICANT CHANGE UP (ref 4.8–10.8)

## 2019-03-05 RX ORDER — LOSARTAN POTASSIUM 100 MG/1
100 TABLET, FILM COATED ORAL DAILY
Qty: 0 | Refills: 0 | Status: DISCONTINUED | OUTPATIENT
Start: 2019-03-05 | End: 2019-03-07

## 2019-03-05 RX ORDER — PANTOPRAZOLE SODIUM 20 MG/1
40 TABLET, DELAYED RELEASE ORAL
Qty: 0 | Refills: 0 | Status: DISCONTINUED | OUTPATIENT
Start: 2019-03-05 | End: 2019-03-07

## 2019-03-05 RX ORDER — ACETAMINOPHEN 500 MG
650 TABLET ORAL EVERY 6 HOURS
Qty: 0 | Refills: 0 | Status: DISCONTINUED | OUTPATIENT
Start: 2019-03-05 | End: 2019-03-07

## 2019-03-05 RX ADMIN — TICAGRELOR 90 MILLIGRAM(S): 90 TABLET ORAL at 05:21

## 2019-03-05 RX ADMIN — Medication 25 MILLIGRAM(S): at 05:21

## 2019-03-05 RX ADMIN — Medication 650 MILLIGRAM(S): at 11:18

## 2019-03-05 RX ADMIN — LOSARTAN POTASSIUM 50 MILLIGRAM(S): 100 TABLET, FILM COATED ORAL at 05:21

## 2019-03-05 RX ADMIN — ATORVASTATIN CALCIUM 80 MILLIGRAM(S): 80 TABLET, FILM COATED ORAL at 21:23

## 2019-03-05 RX ADMIN — Medication 81 MILLIGRAM(S): at 11:18

## 2019-03-05 RX ADMIN — TICAGRELOR 90 MILLIGRAM(S): 90 TABLET ORAL at 17:09

## 2019-03-05 RX ADMIN — Medication 650 MILLIGRAM(S): at 11:20

## 2019-03-05 NOTE — PROGRESS NOTE ADULT - ASSESSMENT
74 yo M with PMHx of CAD s/p PCI x 2 to RCA (06/2018), essential HTN, DLD presents with chest pain and epistaxis. BP on admission was 197/90. He is therefore admitted for hypertensive urgency.    #) Lower epigastric pain and epistaxis possibly 2/2 hypertensive urgency, hemodynamically stable, resolved    - Trop negative x 3, EKG showed new TWI in V5/V6, old TWI in II, III, aVF   - Follow-up with Cardiology   - Continue monitoring on Telemetry  - Follow-up 2D Echo   - Can increase Losartan from 50 --> 100 mg QD, continue with Toprol XL 25 mg QD    - Will need ENT if epistaxis recurs     #) CAD  - Continue with Aspirin 81 mg QD, Brilinita 90 mg BID   - Continue with Lipitor 80 mg QHS    #) DVT ppx: No anticoagulation needed  #) GI ppx: PO Protonix 40 mg QD    #) Activity: Ambulate as tolerated  #) Diet: Low sodium    #) Dispo: Pending   #) Full Code 72 yo M with PMHx of CAD s/p PCI x 2 to RCA (06/2018), essential HTN, DLD presents with chest pain and epistaxis. BP on admission was 197/90. He is therefore admitted for hypertensive urgency.    #) Lower epigastric pain and epistaxis possibly 2/2 hypertensive urgency, hemodynamically stable, resolved    - Trop negative x 3, EKG showed new TWI in V5/V6, old TWI in II, III, aVF   - Follow-up with Cardiology   - Continue monitoring on Telemetry  - Follow-up 2D Echo, US Abdomen   - Can increase Losartan from 50 --> 100 mg QD, continue with Toprol XL 25 mg QD    - Will need ENT if epistaxis recurs   - Keep active T+S, transfuse if Hb < 7  - As per Dr. Levine will need nuclear exercise stress testing due to TWI    #) CAD  - Continue with Aspirin 81 mg QD, Brilinita 90 mg BID   - Continue with Lipitor 80 mg QHS    #) DVT ppx: No anticoagulation needed  #) GI ppx: PO Protonix 40 mg QD    #) Activity: Ambulate as tolerated  #) Diet: Low sodium    #) Dispo: Pending   #) Full Code

## 2019-03-05 NOTE — CONSULT NOTE ADULT - SUBJECTIVE AND OBJECTIVE BOX
Patient is a 73y old  Male who presents with a chief complaint of Epistaxis (05 Mar 2019 15:16)      HPI:  Patient is a 73 yo M with PMHx of CAD s/p PCI * 2, stent to RCA in June 2018, HTN, DLD presenting from home today with complaints of some "chest pain" and bleeding from his mouth and lips. Per patient, he says he had some chest pain prior to admission. When asked to describe the pain (heavy pressure or sharp pain both asked specifically) he only reports that "it feels as if blood is in his throat." When asked to point to the area of pain, he points to the epigastrium. He says that his chest pain was NOT similar to his chest pain in the past which required him to have stents. He is more concerned in regards to the bleeding from nose and his lips. He had two episodes, one in the AM while in the shower and one at night Prior to admission. Associated symptoms include a headache. He denies any fevers, chills, nausea, vomiting, or hematemesis. He says that he did have some bleeding from the same sites about 2 months ago which lasted about one week but was a smaller quantity of blood. He is unable to quantify exactly how much blood he lost but knows that it was more this time. He last saw his cardiologist on Feb 20th, 2019 and has a followed up schedule for March 20th, 2019. EKG with some noted TWI in lateral leads on admission. At time of interview, patient denying any active chest pain and reports that he has not had any bleeding since the AM of admission. First set of cardiac enzymes is negative. (04 Mar 2019 18:09)      PAST MEDICAL & SURGICAL HISTORY:  CAD (coronary artery disease): s/p pci  H/O heart artery stent: x 2  HLD (hyperlipidemia)  HTN (hypertension)  S/P drug eluting coronary stent placement                      PREVIOUS DIAGNOSTIC TESTING:      ECHO  FINDINGS:    STRESS  FINDINGS:    CATHETERIZATION  FINDINGS:    MEDICATIONS  (STANDING):  aspirin enteric coated 81 milliGRAM(s) Oral daily  atorvastatin 80 milliGRAM(s) Oral at bedtime  losartan 100 milliGRAM(s) Oral daily  metoprolol succinate ER 25 milliGRAM(s) Oral daily  pantoprazole    Tablet 40 milliGRAM(s) Oral before breakfast  ticagrelor 90 milliGRAM(s) Oral two times a day    MEDICATIONS  (PRN):  acetaminophen   Tablet .. 650 milliGRAM(s) Oral every 6 hours PRN Temp greater or equal to 38C (100.4F), Mild Pain (1 - 3)      FAMILY HISTORY:  No pertinent family history in first degree relatives      SOCIAL HISTORY:    CIGARETTES:    ALCOHOL:        Vital Signs Last 24 Hrs  T(C): 35.7 (06 Mar 2019 05:37), Max: 35.8 (05 Mar 2019 13:44)  T(F): 96.3 (06 Mar 2019 05:37), Max: 96.5 (05 Mar 2019 13:44)  HR: 66 (06 Mar 2019 05:37) (62 - 66)  BP: 137/76 (06 Mar 2019 05:37) (137/76 - 156/94)  BP(mean): --  RR: 18 (06 Mar 2019 05:37) (18 - 18)  SpO2: --            INTERPRETATION OF TELEMETRY:    ECG:    I&O's Detail    05 Mar 2019 07:01  -  06 Mar 2019 07:00  --------------------------------------------------------  IN:    Oral Fluid: 390 mL  Total IN: 390 mL    OUT:    Voided: 575 mL  Total OUT: 575 mL    Total NET: -185 mL          LABS:                        15.7   9.47  )-----------( 192      ( 05 Mar 2019 08:11 )             46.6     03-05    144  |  106  |  13  ----------------------------<  121<H>  4.5   |  23  |  1.0    Ca    9.2      05 Mar 2019 08:11    TPro  6.6  /  Alb  4.0  /  TBili  0.7  /  DBili  x   /  AST  18  /  ALT  20  /  AlkPhos  68  03-05    CARDIAC MARKERS ( 05 Mar 2019 08:11 )  x     / <0.01 ng/mL / 148 U/L / x     / 2.2 ng/mL  CARDIAC MARKERS ( 04 Mar 2019 20:05 )  x     / <0.01 ng/mL / 136 U/L / x     / 2.3 ng/mL  CARDIAC MARKERS ( 04 Mar 2019 12:26 )  x     / <0.01 ng/mL / x     / x     / x          PT/INR - ( 04 Mar 2019 12:26 )   PT: 11.10 sec;   INR: 0.96 ratio         PTT - ( 04 Mar 2019 12:26 )  PTT:33.7 sec    I&O's Summary    05 Mar 2019 07:01  -  06 Mar 2019 07:00  --------------------------------------------------------  IN: 390 mL / OUT: 575 mL / NET: -185 mL      BNP  RADIOLOGY & ADDITIONAL STUDIES:

## 2019-03-05 NOTE — PROGRESS NOTE ADULT - SUBJECTIVE AND OBJECTIVE BOX
OVERNIGHT EVENTS:   No acute overnight events. Mild headache. Epistaxis resolved, no chest pain.     MEDICATIONS:  STANDING MEDICATIONS  aspirin enteric coated 81 milliGRAM(s) Oral daily  atorvastatin 80 milliGRAM(s) Oral at bedtime  losartan 50 milliGRAM(s) Oral daily  metoprolol succinate ER 25 milliGRAM(s) Oral daily  ticagrelor 90 milliGRAM(s) Oral two times a day    PRN MEDICATIONS  acetaminophen   Tablet .. 650 milliGRAM(s) Oral every 6 hours PRN    VITALS:   T(F): 96.5  HR: 62  BP: 156/94  RR: 18  SpO2: 97%    LABS:                        15.7   9.47  )-----------( 192      ( 05 Mar 2019 08:11 )             46.6     03-05    144  |  106  |  13  ----------------------------<  121<H>  4.5   |  23  |  1.0    Ca    9.2      05 Mar 2019 08:11    TPro  6.6  /  Alb  4.0  /  TBili  0.7  /  DBili  x   /  AST  18  /  ALT  20  /  AlkPhos  68  03-05    PT/INR - ( 04 Mar 2019 12:26 )   PT: 11.10 sec;   INR: 0.96 ratio         PTT - ( 04 Mar 2019 12:26 )  PTT:33.7 sec      Creatine Kinase, Serum: 148 U/L (03-05-19 @ 08:11)  Troponin T, Serum: <0.01 ng/mL (03-05-19 @ 08:11)  Creatine Kinase, Serum: 136 U/L (03-04-19 @ 20:05)  Troponin T, Serum: <0.01 ng/mL (03-04-19 @ 20:05)      CARDIAC MARKERS ( 05 Mar 2019 08:11 )  x     / <0.01 ng/mL / 148 U/L / x     / 2.2 ng/mL  CARDIAC MARKERS ( 04 Mar 2019 20:05 )  x     / <0.01 ng/mL / 136 U/L / x     / 2.3 ng/mL  CARDIAC MARKERS ( 04 Mar 2019 12:26 )  x     / <0.01 ng/mL / x     / x     / x          RADIOLOGY:    PHYSICAL EXAM:  GEN: No acute distress  HEENT: NCAT  LUNGS: Clear to auscultation bilaterally   HEART: S1/S2 present. RRR.   ABD: Soft, non-tender, non-distended. Bowel sounds present  EXT: No pitting edema  NEURO: AAOX3 OVERNIGHT EVENTS:   No acute overnight events. Mild headache. Epistaxis resolved, no chest pain.     MEDICATIONS:  STANDING MEDICATIONS  aspirin enteric coated 81 milliGRAM(s) Oral daily  atorvastatin 80 milliGRAM(s) Oral at bedtime  losartan 50 milliGRAM(s) Oral daily  metoprolol succinate ER 25 milliGRAM(s) Oral daily  ticagrelor 90 milliGRAM(s) Oral two times a day    PRN MEDICATIONS  acetaminophen   Tablet .. 650 milliGRAM(s) Oral every 6 hours PRN    VITALS:   T(F): 96.5  HR: 62  BP: 156/94  RR: 18  SpO2: 97%    LABS:                        15.7   9.47  )-----------( 192      ( 05 Mar 2019 08:11 )             46.6     03-05    144  |  106  |  13  ----------------------------<  121<H>  4.5   |  23  |  1.0    Ca    9.2      05 Mar 2019 08:11    TPro  6.6  /  Alb  4.0  /  TBili  0.7  /  DBili  x   /  AST  18  /  ALT  20  /  AlkPhos  68  03-05    PT/INR - ( 04 Mar 2019 12:26 )   PT: 11.10 sec;   INR: 0.96 ratio         PTT - ( 04 Mar 2019 12:26 )  PTT:33.7 sec      Creatine Kinase, Serum: 148 U/L (03-05-19 @ 08:11)  Troponin T, Serum: <0.01 ng/mL (03-05-19 @ 08:11)  Creatine Kinase, Serum: 136 U/L (03-04-19 @ 20:05)  Troponin T, Serum: <0.01 ng/mL (03-04-19 @ 20:05)      CARDIAC MARKERS ( 05 Mar 2019 08:11 )  x     / <0.01 ng/mL / 148 U/L / x     / 2.2 ng/mL  CARDIAC MARKERS ( 04 Mar 2019 20:05 )  x     / <0.01 ng/mL / 136 U/L / x     / 2.3 ng/mL  CARDIAC MARKERS ( 04 Mar 2019 12:26 )  x     / <0.01 ng/mL / x     / x     / x          RADIOLOGY:  Reviewed     PHYSICAL EXAM:  GEN: No acute distress  HEENT: NCAT  LUNGS: Clear to auscultation bilaterally   HEART: S1/S2 present. RRR.   ABD: Soft, mild tenderness periumbilical area, non-distended. Bowel sounds present  EXT: No pitting edema  NEURO: AAOX3

## 2019-03-06 LAB
ANION GAP SERPL CALC-SCNC: 11 MMOL/L — SIGNIFICANT CHANGE UP (ref 7–14)
BASOPHILS # BLD AUTO: 0.05 K/UL — SIGNIFICANT CHANGE UP (ref 0–0.2)
BASOPHILS NFR BLD AUTO: 0.5 % — SIGNIFICANT CHANGE UP (ref 0–1)
BUN SERPL-MCNC: 17 MG/DL — SIGNIFICANT CHANGE UP (ref 10–20)
CALCIUM SERPL-MCNC: 9.2 MG/DL — SIGNIFICANT CHANGE UP (ref 8.5–10.1)
CHLORIDE SERPL-SCNC: 112 MMOL/L — HIGH (ref 98–110)
CO2 SERPL-SCNC: 22 MMOL/L — SIGNIFICANT CHANGE UP (ref 17–32)
CREAT SERPL-MCNC: 1.1 MG/DL — SIGNIFICANT CHANGE UP (ref 0.7–1.5)
EOSINOPHIL # BLD AUTO: 0.44 K/UL — SIGNIFICANT CHANGE UP (ref 0–0.7)
EOSINOPHIL NFR BLD AUTO: 4.5 % — SIGNIFICANT CHANGE UP (ref 0–8)
GLUCOSE SERPL-MCNC: 116 MG/DL — HIGH (ref 70–99)
HCT VFR BLD CALC: 47.3 % — SIGNIFICANT CHANGE UP (ref 42–52)
HGB BLD-MCNC: 15.5 G/DL — SIGNIFICANT CHANGE UP (ref 14–18)
IMM GRANULOCYTES NFR BLD AUTO: 0.5 % — HIGH (ref 0.1–0.3)
LYMPHOCYTES # BLD AUTO: 2.15 K/UL — SIGNIFICANT CHANGE UP (ref 1.2–3.4)
LYMPHOCYTES # BLD AUTO: 22 % — SIGNIFICANT CHANGE UP (ref 20.5–51.1)
MCHC RBC-ENTMCNC: 29.4 PG — SIGNIFICANT CHANGE UP (ref 27–31)
MCHC RBC-ENTMCNC: 32.8 G/DL — SIGNIFICANT CHANGE UP (ref 32–37)
MCV RBC AUTO: 89.8 FL — SIGNIFICANT CHANGE UP (ref 80–94)
MONOCYTES # BLD AUTO: 1.17 K/UL — HIGH (ref 0.1–0.6)
MONOCYTES NFR BLD AUTO: 12 % — HIGH (ref 1.7–9.3)
NEUTROPHILS # BLD AUTO: 5.9 K/UL — SIGNIFICANT CHANGE UP (ref 1.4–6.5)
NEUTROPHILS NFR BLD AUTO: 60.5 % — SIGNIFICANT CHANGE UP (ref 42.2–75.2)
NRBC # BLD: 0 /100 WBCS — SIGNIFICANT CHANGE UP (ref 0–0)
PLATELET # BLD AUTO: 197 K/UL — SIGNIFICANT CHANGE UP (ref 130–400)
POTASSIUM SERPL-MCNC: 5.2 MMOL/L — HIGH (ref 3.5–5)
POTASSIUM SERPL-SCNC: 5.2 MMOL/L — HIGH (ref 3.5–5)
RBC # BLD: 5.27 M/UL — SIGNIFICANT CHANGE UP (ref 4.7–6.1)
RBC # FLD: 13.2 % — SIGNIFICANT CHANGE UP (ref 11.5–14.5)
SODIUM SERPL-SCNC: 145 MMOL/L — SIGNIFICANT CHANGE UP (ref 135–146)
WBC # BLD: 9.76 K/UL — SIGNIFICANT CHANGE UP (ref 4.8–10.8)
WBC # FLD AUTO: 9.76 K/UL — SIGNIFICANT CHANGE UP (ref 4.8–10.8)

## 2019-03-06 PROCEDURE — 93979 VASCULAR STUDY: CPT | Mod: 26

## 2019-03-06 RX ADMIN — TICAGRELOR 90 MILLIGRAM(S): 90 TABLET ORAL at 18:04

## 2019-03-06 RX ADMIN — PANTOPRAZOLE SODIUM 40 MILLIGRAM(S): 20 TABLET, DELAYED RELEASE ORAL at 07:08

## 2019-03-06 RX ADMIN — TICAGRELOR 90 MILLIGRAM(S): 90 TABLET ORAL at 07:08

## 2019-03-06 RX ADMIN — ATORVASTATIN CALCIUM 80 MILLIGRAM(S): 80 TABLET, FILM COATED ORAL at 21:11

## 2019-03-06 RX ADMIN — Medication 81 MILLIGRAM(S): at 15:37

## 2019-03-06 RX ADMIN — LOSARTAN POTASSIUM 100 MILLIGRAM(S): 100 TABLET, FILM COATED ORAL at 07:08

## 2019-03-06 NOTE — PROGRESS NOTE ADULT - ASSESSMENT
72 yo M with PMHx of CAD s/p PCI x 2 to RCA (06/2018), essential HTN, DLD presents with chest pain and epistaxis. BP on admission was 197/90. He is therefore admitted for hypertensive urgency.    #Lower epigastric pain and epistaxis - resolved  #hypertensive urgency - resolved   new TWI  ECHO EF 71%  f/u abd US  f/u stress test    #CAD s/p PCI  - Continue with Aspirin 81 mg QD, Brilinita 90 mg BID   - Continue with Lipitor 80 mg QHS    #PPx - early ambulation    possible d/c today if stress test negative

## 2019-03-06 NOTE — PROGRESS NOTE ADULT - SUBJECTIVE AND OBJECTIVE BOX
SUBJ:No chest pain or shortness of breath      MEDICATIONS  (STANDING):  aspirin enteric coated 81 milliGRAM(s) Oral daily  atorvastatin 80 milliGRAM(s) Oral at bedtime  losartan 100 milliGRAM(s) Oral daily  metoprolol succinate ER 25 milliGRAM(s) Oral daily  pantoprazole    Tablet 40 milliGRAM(s) Oral before breakfast  ticagrelor 90 milliGRAM(s) Oral two times a day    MEDICATIONS  (PRN):  acetaminophen   Tablet .. 650 milliGRAM(s) Oral every 6 hours PRN Temp greater or equal to 38C (100.4F), Mild Pain (1 - 3)            Vital Signs Last 24 Hrs  T(C): 35.7 (06 Mar 2019 05:37), Max: 35.8 (05 Mar 2019 13:44)  T(F): 96.3 (06 Mar 2019 05:37), Max: 96.5 (05 Mar 2019 13:44)  HR: 66 (06 Mar 2019 05:37) (62 - 66)  BP: 137/76 (06 Mar 2019 05:37) (137/76 - 156/94)  BP(mean): --  RR: 18 (06 Mar 2019 05:37) (18 - 18)  SpO2: --      ECG:NML    TTE:    LABS:                        15.5   9.76  )-----------( 197      ( 06 Mar 2019 07:41 )             47.3     03-05    144  |  106  |  13  ----------------------------<  121<H>  4.5   |  23  |  1.0    Ca    9.2      05 Mar 2019 08:11    TPro  6.6  /  Alb  4.0  /  TBili  0.7  /  DBili  x   /  AST  18  /  ALT  20  /  AlkPhos  68  03-05    CARDIAC MARKERS ( 05 Mar 2019 08:11 )  x     / <0.01 ng/mL / 148 U/L / x     / 2.2 ng/mL  CARDIAC MARKERS ( 04 Mar 2019 20:05 )  x     / <0.01 ng/mL / 136 U/L / x     / 2.3 ng/mL  CARDIAC MARKERS ( 04 Mar 2019 12:26 )  x     / <0.01 ng/mL / x     / x     / x          PT/INR - ( 04 Mar 2019 12:26 )   PT: 11.10 sec;   INR: 0.96 ratio         PTT - ( 04 Mar 2019 12:26 )  PTT:33.7 sec    I&O's Summary    05 Mar 2019 07:01  -  06 Mar 2019 07:00  --------------------------------------------------------  IN: 390 mL / OUT: 575 mL / NET: -185 mL      BNP

## 2019-03-06 NOTE — PROGRESS NOTE ADULT - SUBJECTIVE AND OBJECTIVE BOX
FLORENCIA SIMMONS  73y, Male  Allergy: No Known Allergies    Hospital Day: 2d    Patient seen and examined earlier today.  no further epistaxis or bleeding, denies any chest pain    PMH/PSH:  PAST MEDICAL & SURGICAL HISTORY:  CAD (coronary artery disease): s/p pci  H/O heart artery stent: x 2  HLD (hyperlipidemia)  HTN (hypertension)  S/P drug eluting coronary stent placement        VITALS:  T(F): 96.3 (03-06-19 @ 05:37), Max: 96.5 (03-05-19 @ 13:44)  HR: 66 (03-06-19 @ 05:37)  BP: 137/76 (03-06-19 @ 05:37) (137/76 - 156/94)  RR: 18 (03-06-19 @ 05:37)  SpO2: --    TESTS & MEASUREMENTS:  Weight (Kg): 90.7 (03-04-19 @ 17:49)      03-05-19 @ 07:01  -  03-06-19 @ 07:00  --------------------------------------------------------  IN: 390 mL / OUT: 575 mL / NET: -185 mL                            15.5   9.76  )-----------( 197      ( 06 Mar 2019 07:41 )             47.3       03-06    145  |  112<H>  |  17  ----------------------------<  116<H>  5.2<H>   |  22  |  1.1    Ca    9.2      06 Mar 2019 07:41    TPro  6.6  /  Alb  4.0  /  TBili  0.7  /  DBili  x   /  AST  18  /  ALT  20  /  AlkPhos  68  03-05    LIVER FUNCTIONS - ( 05 Mar 2019 08:11 )  Alb: 4.0 g/dL / Pro: 6.6 g/dL / ALK PHOS: 68 U/L / ALT: 20 U/L / AST: 18 U/L / GGT: x           CARDIAC MARKERS ( 05 Mar 2019 08:11 )  x     / <0.01 ng/mL / 148 U/L / x     / 2.2 ng/mL  CARDIAC MARKERS ( 04 Mar 2019 20:05 )  x     / <0.01 ng/mL / 136 U/L / x     / 2.3 ng/mL        MEDICATIONS:  MEDICATIONS  (STANDING):  aspirin enteric coated 81 milliGRAM(s) Oral daily  atorvastatin 80 milliGRAM(s) Oral at bedtime  losartan 100 milliGRAM(s) Oral daily  metoprolol succinate ER 25 milliGRAM(s) Oral daily  pantoprazole    Tablet 40 milliGRAM(s) Oral before breakfast  ticagrelor 90 milliGRAM(s) Oral two times a day    MEDICATIONS  (PRN):  acetaminophen   Tablet .. 650 milliGRAM(s) Oral every 6 hours PRN Temp greater or equal to 38C (100.4F), Mild Pain (1 - 3)      HOME MEDICATIONS:  losartan 50 mg oral tablet (03-04)  metoprolol succinate 25 mg oral tablet, extended release (03-04)      PHYSICAL EXAM:  GENERAL: A&O x3,  in NAD/P  HEENT: No Swelling, no bleeding  CHEST/LUNG: Good air entry, No wheezing  HEART: RRR, No murmurs  ABDOMEN: Soft, Bowel sounds present  EXTREMITIES:  No clubbing, no edema

## 2019-03-06 NOTE — PROGRESS NOTE ADULT - SUBJECTIVE AND OBJECTIVE BOX
SUBJECTIVE:   No complaints    PAST MEDICAL & SURGICAL HISTORY:  PAST MEDICAL & SURGICAL HISTORY:  CAD (coronary artery disease): s/p pci  H/O heart artery stent: x 2  HLD (hyperlipidemia)  HTN (hypertension)  S/P drug eluting coronary stent placement    ALLERGIES:  No Known Allergies    MEDICATIONS:  STANDING MEDICATIONS  aspirin enteric coated 81 milliGRAM(s) Oral daily  atorvastatin 80 milliGRAM(s) Oral at bedtime  losartan 100 milliGRAM(s) Oral daily  metoprolol succinate ER 25 milliGRAM(s) Oral daily  pantoprazole    Tablet 40 milliGRAM(s) Oral before breakfast  ticagrelor 90 milliGRAM(s) Oral two times a day    PRN MEDICATIONS  acetaminophen   Tablet .. 650 milliGRAM(s) Oral every 6 hours PRN    VITALS:   T(F): 96.3  HR: 66  BP: 137/76  RR: 18  SpO2: --    LABS:                        15.5   9.76  )-----------( 197      ( 06 Mar 2019 07:41 )             47.3     03-06    145  |  112<H>  |  17  ----------------------------<  116<H>  5.2<H>   |  22  |  1.1    Ca    9.2      06 Mar 2019 07:41    TPro  6.6  /  Alb  4.0  /  TBili  0.7  /  DBili  x   /  AST  18  /  ALT  20  /  AlkPhos  68  03-05    CARDIAC MARKERS ( 05 Mar 2019 08:11 )  x     / <0.01 ng/mL / 148 U/L / x     / 2.2 ng/mL  CARDIAC MARKERS ( 04 Mar 2019 20:05 )  x     / <0.01 ng/mL / 136 U/L / x     / 2.3 ng/mL      RADIOLOGY:  < from: NM Nuclear Stress Multiple (03.06.19 @ 13:30) >  1.   No evidence for ischemia at the level of exercise attained .  2.   Fixed defect in the inferolateral wall of the left ventricle which   does not thicken completely consistent with prior injury (scar)  3.  Dilated left ventricle with normal global left ventricular wall   motion. All other walls of the left ventricle thicken.  4.  Left ventricular ejection fraction calculated as 66 % which is within   the range of normal. Echocardiographic estimated ejection fraction was   71% on 3/5/2019 and 60-65% on 6/29/2018    < end of copied text >    PHYSICAL EXAM:  GEN: No acute distress  HEENT: NCAT  LUNGS: Clear to auscultation bilaterally   HEART: S1/S2 present. RRR.   ABD: Soft, non-tender, non-distended. Bowel sounds present  EXT: No pitting edema  NEURO: AAOX3

## 2019-03-06 NOTE — PROGRESS NOTE ADULT - ASSESSMENT
74 yo M with PMHx of CAD s/p PCI x 2 to RCA (06/2018), essential HTN, DLD presents with chest pain and epistaxis. BP on admission was 197/90. He is therefore admitted for hypertensive urgency.    #) Lower epigastric pain and epistaxis possibly 2/2 hypertensive urgency, hemodynamically stable, resolved    - Trop negative x 3, EKG showed new TWI in V5/V6, old TWI in II, III, aVF   - Follow-up with Cardiology   - Continue monitoring on Telemetry  - EF 71%; follow-up US Abdomen   - Continue with Losartan 100 mg QD, Toprol XL 25 mg QD    - Will need ENT if epistaxis recurs   - Keep active T+S, transfuse if Hb < 7  - Nuclear stress testing shows old scar    #) CAD  - Continue with Aspirin 81 mg QD, Brilinita 90 mg BID   - Continue with Lipitor 80 mg QHS    #) DVT ppx: No anticoagulation needed  #) GI ppx: PO Protonix 40 mg QD    #) Activity: Ambulate as tolerated  #) Diet: Low sodium    #) Dispo: Pending   #) Full Code

## 2019-03-07 ENCOUNTER — TRANSCRIPTION ENCOUNTER (OUTPATIENT)
Age: 74
End: 2019-03-07

## 2019-03-07 VITALS
TEMPERATURE: 97 F | WEIGHT: 211.64 LBS | RESPIRATION RATE: 18 BRPM | HEART RATE: 62 BPM | DIASTOLIC BLOOD PRESSURE: 78 MMHG | SYSTOLIC BLOOD PRESSURE: 124 MMHG

## 2019-03-07 RX ORDER — ASPIRIN/CALCIUM CARB/MAGNESIUM 324 MG
1 TABLET ORAL
Qty: 0 | Refills: 0 | DISCHARGE
Start: 2019-03-07

## 2019-03-07 RX ORDER — TICAGRELOR 90 MG/1
1 TABLET ORAL
Qty: 0 | Refills: 0 | DISCHARGE
Start: 2019-03-07

## 2019-03-07 RX ORDER — ATORVASTATIN CALCIUM 80 MG/1
1 TABLET, FILM COATED ORAL
Qty: 0 | Refills: 0 | DISCHARGE
Start: 2019-03-07

## 2019-03-07 RX ORDER — LOSARTAN POTASSIUM 100 MG/1
1 TABLET, FILM COATED ORAL
Qty: 30 | Refills: 0
Start: 2019-03-07 | End: 2019-04-05

## 2019-03-07 RX ORDER — LOSARTAN POTASSIUM 100 MG/1
1 TABLET, FILM COATED ORAL
Qty: 0 | Refills: 0 | COMMUNITY

## 2019-03-07 RX ADMIN — Medication 25 MILLIGRAM(S): at 05:50

## 2019-03-07 RX ADMIN — PANTOPRAZOLE SODIUM 40 MILLIGRAM(S): 20 TABLET, DELAYED RELEASE ORAL at 05:50

## 2019-03-07 RX ADMIN — LOSARTAN POTASSIUM 100 MILLIGRAM(S): 100 TABLET, FILM COATED ORAL at 05:49

## 2019-03-07 RX ADMIN — TICAGRELOR 90 MILLIGRAM(S): 90 TABLET ORAL at 05:49

## 2019-03-07 NOTE — DISCHARGE NOTE ADULT - CARE PLAN
Principal Discharge DX:	Chest pain  Goal:	Prevent recurrence  Assessment and plan of treatment:	You were admitted for chest pain due to uncontrolled high blood pressure. You had underwent a nuclear exercise stress test which was negative, except for an old scarring of the heart from prior ischemic event in 2018. Please follow-up with your Cardiologist (Dr. Levine) in 2 weeks. We have increased your blood pressure medication Losartan from 50 mg to 100 mg once daily.

## 2019-03-07 NOTE — PROGRESS NOTE ADULT - ASSESSMENT
72 yo M with PMHx of CAD s/p PCI x 2 to RCA (06/2018), essential HTN, DLD presents with chest pain and epistaxis. BP on admission was 197/90. He is therefore admitted for hypertensive urgency.    #Lower epigastric pain and epistaxis - resolved  #hypertensive urgency - resolved   new TWI  ECHO EF 71%   abd US-- no evidence of aneurysm  f/u stress test< from: NM Nuclear Stress Multiple (03.06.19 @ 13:30) >    No evidence for ischemia at the level of exercise attained .  2.   Fixed defect in the inferolateral wall of the left ventricle which   does not thicken completely consistent with prior injury (scar)  3.  Dilated left ventricle with normal global left ventricular wall   motion. All other walls of the left ventricle thicken.  4.  Left ventricular ejection fraction calculated as 66 % which is within   the range of normal. Echocardiographic estimated ejection fraction was   71% on 3/5/2019 and 60-65% on 6/29/2018    #CAD s/p PCI  - Continue with Aspirin 81 mg QD, Brilinita 90 mg BID   - Continue with Lipitor    # Dc home today--spent more than 30mins

## 2019-03-07 NOTE — PROGRESS NOTE ADULT - SUBJECTIVE AND OBJECTIVE BOX
SUBJECTIVE:    Patient is a 73y old Male who presents with a chief complaint of Epistaxis (07 Mar 2019 09:35)    Currently admitted to medicine with the primary diagnosis of Chest pain     Today is hospital day 3d.     PAST MEDICAL & SURGICAL HISTORY  CAD (coronary artery disease): s/p pci  H/O heart artery stent: x 2  HLD (hyperlipidemia)  HTN (hypertension)  S/P drug eluting coronary stent placement    ALLERGIES:  No Known Allergies    MEDICATIONS:  STANDING MEDICATIONS  aspirin enteric coated 81 milliGRAM(s) Oral daily  atorvastatin 80 milliGRAM(s) Oral at bedtime  losartan 100 milliGRAM(s) Oral daily  metoprolol succinate ER 25 milliGRAM(s) Oral daily  pantoprazole    Tablet 40 milliGRAM(s) Oral before breakfast  ticagrelor 90 milliGRAM(s) Oral two times a day    PRN MEDICATIONS  acetaminophen   Tablet .. 650 milliGRAM(s) Oral every 6 hours PRN    VITALS:   T(F): 97  HR: 62  BP: 124/78  RR: 18  SpO2: 97%    LABS:                        15.5   9.76  )-----------( 197      ( 06 Mar 2019 07:41 )             47.3     03-06    145  |  112<H>  |  17  ----------------------------<  116<H>  5.2<H>   |  22  |  1.1    Ca    9.2      06 Mar 2019 07:41                    RADIOLOGY:    PHYSICAL EXAM:  GEN: No acute distress  LUNGS: Clear to auscultation bilaterally   HEART: S1/S2 present. RRR.   ABD/ GI: Soft, non-tender, non-distended. Bowel sounds present  EXT: NC/NC/NE/2+PP/LANDAVERDE  NEURO: AAOX3

## 2019-03-07 NOTE — DISCHARGE NOTE ADULT - MEDICATION SUMMARY - MEDICATIONS TO TAKE
I will START or STAY ON the medications listed below when I get home from the hospital:    aspirin 81 mg oral delayed release tablet  -- 1 tab(s) by mouth once a day  -- Indication: For Coronary artery disease    Cozaar 100 mg oral tablet  -- 1 tab(s) by mouth once a day  -- Indication: For Hypertension    atorvastatin 80 mg oral tablet  -- 1 tab(s) by mouth once a day (at bedtime)  -- Indication: For Dyslipidemia    ticagrelor 90 mg oral tablet  -- 1 tab(s) by mouth 2 times a day  -- Indication: For Stent    metoprolol succinate 25 mg oral tablet, extended release  -- 1 tab(s) by mouth once a day  -- Indication: For Hypertension

## 2019-03-07 NOTE — DISCHARGE NOTE ADULT - PATIENT PORTAL LINK FT
You can access the [email or web address], offered by [facility], by registering with the following website: [email or web address]

## 2019-03-07 NOTE — DISCHARGE NOTE ADULT - HOSPITAL COURSE
72 yo M with PMHx of CAD s/p PCI x 2 to RCA (06/2018), essential HTN, DLD presents with chest pain and epistaxis. BP on admission was 197/90. He is therefore admitted for hypertensive urgency. Lower epigastric pain and epistaxis possibly 2/2 hypertensive urgency, hemodynamically stable, resolved. Trop negative x 3, EKG showed new TWI in V5/V6, old TWI in II, III, aVF. 2D Echo: EF 71%. US Abdomen performed for AAA was negative. Losartan was increased from 50 to 100 mg QD, Toprol XL 25 mg QD. Epistaxis never recurred. Nuclear stress testing shows old scar. To be discharged today, with outpatient follow-up with Dr. Levine.

## 2019-03-07 NOTE — DISCHARGE NOTE ADULT - PLAN OF CARE
Prevent recurrence You were admitted for chest pain due to uncontrolled high blood pressure. You had underwent a nuclear exercise stress test which was negative, except for an old scarring of the heart from prior ischemic event in 2018. Please follow-up with your Cardiologist (Dr. Levine) in 2 weeks. We have increased your blood pressure medication Losartan from 50 mg to 100 mg once daily.

## 2019-03-07 NOTE — PROGRESS NOTE ADULT - SUBJECTIVE AND OBJECTIVE BOX
SUBJ:No chest pain or shortness of breath      MEDICATIONS  (STANDING):  aspirin enteric coated 81 milliGRAM(s) Oral daily  atorvastatin 80 milliGRAM(s) Oral at bedtime  losartan 100 milliGRAM(s) Oral daily  metoprolol succinate ER 25 milliGRAM(s) Oral daily  pantoprazole    Tablet 40 milliGRAM(s) Oral before breakfast  ticagrelor 90 milliGRAM(s) Oral two times a day    MEDICATIONS  (PRN):  acetaminophen   Tablet .. 650 milliGRAM(s) Oral every 6 hours PRN Temp greater or equal to 38C (100.4F), Mild Pain (1 - 3)            Vital Signs Last 24 Hrs  T(C): 36.1 (07 Mar 2019 06:12), Max: 36.2 (06 Mar 2019 17:00)  T(F): 97 (07 Mar 2019 06:12), Max: 97.2 (06 Mar 2019 17:00)  HR: 62 (07 Mar 2019 06:12) (62 - 70)  BP: 124/78 (07 Mar 2019 06:12) (124/78 - 136/60)  BP(mean): --  RR: 18 (07 Mar 2019 06:12) (18 - 18)  SpO2: 97% (06 Mar 2019 17:00) (97% - 97%)      ECG:NML    TTE:    LABS:                        15.5   9.76  )-----------( 197      ( 06 Mar 2019 07:41 )             47.3     03-06    145  |  112<H>  |  17  ----------------------------<  116<H>  5.2<H>   |  22  |  1.1    Ca    9.2      06 Mar 2019 07:41              I&O's Summary    06 Mar 2019 07:01  -  07 Mar 2019 07:00  --------------------------------------------------------  IN: 210 mL / OUT: 0 mL / NET: 210 mL    07 Mar 2019 07:01  -  07 Mar 2019 12:34  --------------------------------------------------------  IN: 210 mL / OUT: 0 mL / NET: 210 mL      BNP

## 2019-03-07 NOTE — DISCHARGE NOTE ADULT - CARE PROVIDER_API CALL
Luis Martel (MD)  Cardiovascular Disease; Internal Medicine; Interventional Cardiology  [address] [place] [address]  Phone: [phone]  Fax: [phone]  Follow Up Time:

## 2019-03-12 DIAGNOSIS — K13.79 OTHER LESIONS OF ORAL MUCOSA: ICD-10-CM

## 2019-03-12 DIAGNOSIS — R07.9 CHEST PAIN, UNSPECIFIED: ICD-10-CM

## 2019-03-12 DIAGNOSIS — Z28.21 IMMUNIZATION NOT CARRIED OUT BECAUSE OF PATIENT REFUSAL: ICD-10-CM

## 2019-03-12 DIAGNOSIS — Z79.82 LONG TERM (CURRENT) USE OF ASPIRIN: ICD-10-CM

## 2019-03-12 DIAGNOSIS — I10 ESSENTIAL (PRIMARY) HYPERTENSION: ICD-10-CM

## 2019-03-12 DIAGNOSIS — I25.2 OLD MYOCARDIAL INFARCTION: ICD-10-CM

## 2019-03-12 DIAGNOSIS — R10.13 EPIGASTRIC PAIN: ICD-10-CM

## 2019-03-12 DIAGNOSIS — R94.31 ABNORMAL ELECTROCARDIOGRAM [ECG] [EKG]: ICD-10-CM

## 2019-03-12 DIAGNOSIS — I25.10 ATHEROSCLEROTIC HEART DISEASE OF NATIVE CORONARY ARTERY WITHOUT ANGINA PECTORIS: ICD-10-CM

## 2019-03-12 DIAGNOSIS — R04.0 EPISTAXIS: ICD-10-CM

## 2019-03-12 DIAGNOSIS — Z87.891 PERSONAL HISTORY OF NICOTINE DEPENDENCE: ICD-10-CM

## 2019-03-12 DIAGNOSIS — E78.5 HYPERLIPIDEMIA, UNSPECIFIED: ICD-10-CM

## 2019-03-12 DIAGNOSIS — R04.1 HEMORRHAGE FROM THROAT: ICD-10-CM

## 2019-03-12 DIAGNOSIS — I16.0 HYPERTENSIVE URGENCY: ICD-10-CM

## 2019-03-12 DIAGNOSIS — Z95.5 PRESENCE OF CORONARY ANGIOPLASTY IMPLANT AND GRAFT: ICD-10-CM

## 2019-06-05 ENCOUNTER — OUTPATIENT (OUTPATIENT)
Dept: OUTPATIENT SERVICES | Facility: HOSPITAL | Age: 74
LOS: 1 days | Discharge: HOME | End: 2019-06-05

## 2019-06-05 DIAGNOSIS — I10 ESSENTIAL (PRIMARY) HYPERTENSION: ICD-10-CM

## 2019-06-05 DIAGNOSIS — E78.5 HYPERLIPIDEMIA, UNSPECIFIED: ICD-10-CM

## 2019-06-05 DIAGNOSIS — I25.10 ATHEROSCLEROTIC HEART DISEASE OF NATIVE CORONARY ARTERY WITHOUT ANGINA PECTORIS: ICD-10-CM

## 2019-06-05 DIAGNOSIS — Z95.5 PRESENCE OF CORONARY ANGIOPLASTY IMPLANT AND GRAFT: Chronic | ICD-10-CM

## 2019-09-18 NOTE — CONSULT NOTE ADULT - NEUROLOGICAL
SUPERVISING PHYSICIAN:   Devaughn Echeverria MD 



DISCHARGE DIAGNOSES:

1.  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 
admission.

2.  Pancreatic cancer with liver metastases recently diagnosed.  He is being 

     followed by Dr. Carmencita Whitman.

3.  Recent diagnosis of right tibial vein DVT presently on Eliquis.

4.  History of hypertension, controlled on medication.

5.  PAULETTE, presently not wearing his C-PAP, awaiting an appointment with his 

     pulmonologist for adjustments to his machine.

6.  History of West Nile virus in 2004. 



HISTORY OF PRESENT ILLNESS:   This is an 85 year-old male patient who was 
brought into the Emergency Room by family members and walking to the right side 
that has been present for 3 days.  He felt as though he was having difficulty 
comprehending things that he was told to do and he was also walking into the 
walls. No other noted weakness, no blurry vision, no slurred speech, no 
headache.  He was recently diagnosed with pancreatic cancer with liver 
metastases as well as the DVT of his right lower leg.  Due to his symptoms, his 
cardiologist recommended that he come to the Emergency Room, he is on an 
anticoagulant.  For his pancreatic cancer, he is followed by Dr. Carmencita Whitman who 
on account of his diagnosis offered palliative treatment or he could go for 
aggressive treatments at [facility] and he has elected to do that.  In the 
Emergency Room, his initial vital signs were temperature of 98.8 with heart rate
of 83, blood pressure 147/79, respiratory rate 18, oxygen saturation 94% on room
air. Lab studies were done and showed WBC at 10,200 with a hemoglobin of 42.2 
and hematocrit of 41.3.  His sodium was 132, potassium 3.8, chloride 99, carbon 
dioxide 22, creatinine 0.68.  Glucose 180, serum osmolality 269.9. The remainder
of his chemistries including his liver panel were within normal limits.  His 
cardiac enzymes were negative.  Lactic acid was 2.  Urinalysis was unremarkable.
 He had a head CT in the Emergency Room that showed no acute intracranial 
abnormality, no evidence for hemorrhage/lesion or large acute infarction.  The 
patient was initially placed in observation in the hospital for TIA-like 
symptoms as well as further workup.



PAST MEDICAL HISTORY: 

1.  Hypertension.

2.  Sleep apnea.

3.  Right tibial vein DVT.

4.  West Nile virus in 2004.

5.  Pancreatic cancer, stage 4 with liver metastases.



PAST SURGICAL HISTORY:   None.



CURRENT MEDICATIONS: 

1.  Norvasc.

2.  Avodart.

3.  Telmisartan. 

4.  Amlodipine.

5.  Eliquis.



ALLERGIES:   No known drug allergies.



FAMILY HISTORY: 



SOCIAL HISTORY:   He is retired.  He is .  He has 2 children.  He lives 
in [place].  He denies any tobacco, ETOH or illicit drug use.



REVIEW OF SYSTEMS: 

GENERAL:  Negative for fatigue, fever or weight changes.

HEENT:  Negative for sinus symptoms, ear pain, vision changes, sore throat.

RESPIRATORY:  Negative for coughing, wheezing, shortness of breath 

CARDIAC:  Negative for chest pain, palpitations, tachycardia.

GI:  Negative for nausea, vomiting, diarrhea. 

GENITOURINARY:  Negative for hematuria, dysuria, polyuria.

MUSCULOSKELETAL:  Negative for arthralgias, myalgias.

SKIN:  Negative for lesions or rashes.

NEUROLOGICAL:  As per the history of present illness. Negative for seizures or 
headaches.  



PHYSICAL EXAMINATION: 



VITAL SIGNS:  Temperature 97.8, heart rate 74, blood pressure 170/83, 
respiratory rate 16, oxygen saturation 95% on room air.



GENERAL:  This is an 85 year-old male patient who is sitting on the side of his 
hospital bed.  He is in no acute distress.



HEENT:  Normocephalic and atraumatic.  Pupils are equal and reactive.  
Oropharynx is clear.



NECK:  Supple without mass.



CHEST:   Essentially clear to auscultation bilaterally.  There is equal rise and
fall of the chest with inspiration and expiration. 



CARDIOVASCULAR:   Regular rate and rhythm.



ABDOMEN:  Soft, nondistended, non-tender.  Bowel sounds are positive.



EXTREMITIES:   No cyanosis, clubbing, or edema.  His bilateral pedal pulses are 
++2.  



NEUROLOGIC:   He is alert and oriented x 3.  He does speak in complete 
sentences.  His cranial nerves II through XII are grossly intact. 



SKIN:  Warm and dry.



FOLLOWUP LABORATORY:  WBC 8.8 with hemoglobin of 13.6, hematocrit 39.9.  
Chemistry was basically unremarkable.  Cholesterol shows a triglyceride 41, LDL 
99.9, HDL 31, TSH 3.22.



His followup testing shows an echocardiogram with normal left ventricular size 
and systolic function with a visually estimated ejection fraction of 60 to 65%. 
He had a brain MRI shows an embolic CVA involving the parasagittal left 
occipital lobe, posterior lesion in the right occipital lobe, right frontal 
parietal cortex of the vertex and left subcortical cerebellar hemisphere solo 
lesion not associated with hemorrhage, mass effect or abnormal enhancement.  His
carotid ultrasound shows Doppler evaluations of bilateral carotid systems and 
vertebral arteries show no hemodynamically significant stenosis.  His lower 
extremity ultrasound shows duplex ultrasound evaluation for right lower 
extremity DVT showing partial thrombus remaining in the right posterior tibial 
vein.  All other labs and films have been reviewed via the EMR.



HOSPITAL COURSE:  The patient was placed in observation in the hospital.  He had
no further neurological deficits.  His vital signs remained stable.  I discussed
at length with his family his present care and that he was on the appropriate 
medications for a stroke and at this point, due to minimal to no neurological 
deficits that it is recommended that he proceed to [facility] to followup 
his pancreatic cancer care and that a neurologist at this time would most likely
not add any additional medications as he is presently on Eliquis.  He will be 
discharged home today in stable condition.



DISCHARGE PLAN:  The patient will be discharged home in stable condition.  He is
to resume his previous diet, his previous medications as well as increase his 
activity as tolerated.  He has a followup appointment with Dr. Valero, his 
primary care physician, on 08/08/19 at 1:30 PM.  He is to return to the hospital
or followup with Dr. Valero or Dr. Whitman for any problems or complications. 




DISCHARGE MEDICATIONS:

1.  Eliquis.

2.  Amlodipine.

3.  Telmisartan.

4.  Dutasteride.

5.  Acetaminophen with codeine.



#[id number]

[facility]
Alert & oriented; no sensory, motor or coordination deficits, normal reflexes

## 2019-09-20 NOTE — ED PROVIDER NOTE - EYES NEGATIVE STATEMENT, MLM
Problem: Falls - Risk of:  Goal: Will remain free from falls  Description  Will remain free from falls  Outcome: Met This Shift  Goal: Absence of physical injury  Description  Absence of physical injury  Outcome: Met This Shift     Problem: Cardiac:  Goal: Ability to maintain vital signs within normal range will improve  Description  Ability to maintain vital signs within normal range will improve  Outcome: Met This Shift  Goal: Cardiovascular alteration will improve  Description  Cardiovascular alteration will improve  Outcome: Met This Shift     Problem: Health Behavior:  Goal: Will modify at least one risk factor affecting health status  Description  Will modify at least one risk factor affecting health status  Outcome: Met This Shift  Goal: Identification of resources available to assist in meeting health care needs will improve  Description  Identification of resources available to assist in meeting health care needs will improve  Outcome: Met This Shift     Problem: Physical Regulation:  Goal: Complications related to the disease process, condition or treatment will be avoided or minimized  Description  Complications related to the disease process, condition or treatment will be avoided or minimized  Outcome: Met This Shift     Problem: Discharge Planning:  Goal: Discharged to appropriate level of care  Description  Discharged to appropriate level of care  Outcome: Met This Shift  Goal: Participates in care planning  Description  Participates in care planning  Outcome: Met This Shift     Problem: Airway Clearance - Ineffective:  Goal: Clear lung sounds  Description  Clear lung sounds  Outcome: Met This Shift  Goal: Ability to maintain a clear airway will improve  Description  Ability to maintain a clear airway will improve  Outcome: Met This Shift     Problem: Gas Exchange - Impaired:  Goal: Levels of oxygenation will improve  Description  Levels of oxygenation will improve  Outcome: Met This Shift Problem: Hyperthermia:  Goal: Ability to maintain a body temperature in the normal range will improve  Description  Ability to maintain a body temperature in the normal range will improve  Outcome: Met This Shift no visual changes.

## 2019-12-12 ENCOUNTER — OUTPATIENT (OUTPATIENT)
Dept: OUTPATIENT SERVICES | Facility: HOSPITAL | Age: 74
LOS: 1 days | Discharge: HOME | End: 2019-12-12

## 2019-12-12 DIAGNOSIS — E78.5 HYPERLIPIDEMIA, UNSPECIFIED: ICD-10-CM

## 2019-12-12 DIAGNOSIS — I25.10 ATHEROSCLEROTIC HEART DISEASE OF NATIVE CORONARY ARTERY WITHOUT ANGINA PECTORIS: ICD-10-CM

## 2019-12-12 DIAGNOSIS — I10 ESSENTIAL (PRIMARY) HYPERTENSION: ICD-10-CM

## 2019-12-12 DIAGNOSIS — Z95.5 PRESENCE OF CORONARY ANGIOPLASTY IMPLANT AND GRAFT: Chronic | ICD-10-CM

## 2020-02-07 NOTE — PATIENT PROFILE ADULT - DATE/TIME OF ACCEPTANCE
----- Message from Sun Banegas MD sent at 2/6/2020  5:34 PM CST -----  Hyacinth,   Please review your lab.  The only abnormality that needs addressing is your elevated Free T4 , indicating a hyperthyroid state.  Please take 1/2 of a levothyroxine one day weekly to try and allow your level to drift down, and continue the whole tablet the other 6 days of the week..  Please plan to recheck your TSH in 3 months to see if it has normalized.  Please do not hesitate to call/email if you have any questions or concerns.  Sun Rojas    ( Nelli , need TSH and Free T4 in 3 mos, no EP needed)   04-Mar-2019 18:35

## 2021-09-17 ENCOUNTER — OUTPATIENT (OUTPATIENT)
Dept: OUTPATIENT SERVICES | Facility: HOSPITAL | Age: 76
LOS: 1 days | Discharge: HOME | End: 2021-09-17
Payer: MEDICARE

## 2021-09-17 ENCOUNTER — RESULT REVIEW (OUTPATIENT)
Age: 76
End: 2021-09-17

## 2021-09-17 VITALS
DIASTOLIC BLOOD PRESSURE: 85 MMHG | TEMPERATURE: 98 F | RESPIRATION RATE: 22 BRPM | SYSTOLIC BLOOD PRESSURE: 166 MMHG | WEIGHT: 207.01 LBS | HEIGHT: 66 IN | OXYGEN SATURATION: 100 % | HEART RATE: 70 BPM

## 2021-09-17 DIAGNOSIS — Z01.818 ENCOUNTER FOR OTHER PREPROCEDURAL EXAMINATION: ICD-10-CM

## 2021-09-17 DIAGNOSIS — Z95.5 PRESENCE OF CORONARY ANGIOPLASTY IMPLANT AND GRAFT: Chronic | ICD-10-CM

## 2021-09-17 DIAGNOSIS — Z98.890 OTHER SPECIFIED POSTPROCEDURAL STATES: Chronic | ICD-10-CM

## 2021-09-17 DIAGNOSIS — S42.321G: ICD-10-CM

## 2021-09-17 LAB
ALBUMIN SERPL ELPH-MCNC: 4.2 G/DL — SIGNIFICANT CHANGE UP (ref 3.5–5.2)
ALP SERPL-CCNC: 79 U/L — SIGNIFICANT CHANGE UP (ref 30–115)
ALT FLD-CCNC: 17 U/L — SIGNIFICANT CHANGE UP (ref 0–41)
ANION GAP SERPL CALC-SCNC: 15 MMOL/L — HIGH (ref 7–14)
APPEARANCE UR: CLEAR — SIGNIFICANT CHANGE UP
APTT BLD: 35.5 SEC — SIGNIFICANT CHANGE UP (ref 27–39.2)
AST SERPL-CCNC: 16 U/L — SIGNIFICANT CHANGE UP (ref 0–41)
BASOPHILS # BLD AUTO: 0.03 K/UL — SIGNIFICANT CHANGE UP (ref 0–0.2)
BASOPHILS NFR BLD AUTO: 0.4 % — SIGNIFICANT CHANGE UP (ref 0–1)
BILIRUB SERPL-MCNC: 0.4 MG/DL — SIGNIFICANT CHANGE UP (ref 0.2–1.2)
BILIRUB UR-MCNC: NEGATIVE — SIGNIFICANT CHANGE UP
BUN SERPL-MCNC: 16 MG/DL — SIGNIFICANT CHANGE UP (ref 10–20)
CALCIUM SERPL-MCNC: 8.8 MG/DL — SIGNIFICANT CHANGE UP (ref 8.5–10.1)
CHLORIDE SERPL-SCNC: 105 MMOL/L — SIGNIFICANT CHANGE UP (ref 98–110)
CO2 SERPL-SCNC: 21 MMOL/L — SIGNIFICANT CHANGE UP (ref 17–32)
COLOR SPEC: YELLOW — SIGNIFICANT CHANGE UP
CREAT SERPL-MCNC: 0.9 MG/DL — SIGNIFICANT CHANGE UP (ref 0.7–1.5)
DIFF PNL FLD: NEGATIVE — SIGNIFICANT CHANGE UP
EOSINOPHIL # BLD AUTO: 0.28 K/UL — SIGNIFICANT CHANGE UP (ref 0–0.7)
EOSINOPHIL NFR BLD AUTO: 3.4 % — SIGNIFICANT CHANGE UP (ref 0–8)
GLUCOSE SERPL-MCNC: 84 MG/DL — SIGNIFICANT CHANGE UP (ref 70–99)
GLUCOSE UR QL: NEGATIVE — SIGNIFICANT CHANGE UP
HCT VFR BLD CALC: 45 % — SIGNIFICANT CHANGE UP (ref 42–52)
HGB BLD-MCNC: 13.9 G/DL — LOW (ref 14–18)
IMM GRANULOCYTES NFR BLD AUTO: 0.5 % — HIGH (ref 0.1–0.3)
INR BLD: 0.96 RATIO — SIGNIFICANT CHANGE UP (ref 0.65–1.3)
KETONES UR-MCNC: NEGATIVE — SIGNIFICANT CHANGE UP
LEUKOCYTE ESTERASE UR-ACNC: NEGATIVE — SIGNIFICANT CHANGE UP
LYMPHOCYTES # BLD AUTO: 1.91 K/UL — SIGNIFICANT CHANGE UP (ref 1.2–3.4)
LYMPHOCYTES # BLD AUTO: 23.1 % — SIGNIFICANT CHANGE UP (ref 20.5–51.1)
MCHC RBC-ENTMCNC: 26.5 PG — LOW (ref 27–31)
MCHC RBC-ENTMCNC: 30.9 G/DL — LOW (ref 32–37)
MCV RBC AUTO: 85.9 FL — SIGNIFICANT CHANGE UP (ref 80–94)
MONOCYTES # BLD AUTO: 1.07 K/UL — HIGH (ref 0.1–0.6)
MONOCYTES NFR BLD AUTO: 12.9 % — HIGH (ref 1.7–9.3)
NEUTROPHILS # BLD AUTO: 4.94 K/UL — SIGNIFICANT CHANGE UP (ref 1.4–6.5)
NEUTROPHILS NFR BLD AUTO: 59.7 % — SIGNIFICANT CHANGE UP (ref 42.2–75.2)
NITRITE UR-MCNC: NEGATIVE — SIGNIFICANT CHANGE UP
NRBC # BLD: 0 /100 WBCS — SIGNIFICANT CHANGE UP (ref 0–0)
PH UR: 5.5 — SIGNIFICANT CHANGE UP (ref 5–8)
PLATELET # BLD AUTO: 232 K/UL — SIGNIFICANT CHANGE UP (ref 130–400)
POTASSIUM SERPL-MCNC: 4.7 MMOL/L — SIGNIFICANT CHANGE UP (ref 3.5–5)
POTASSIUM SERPL-SCNC: 4.7 MMOL/L — SIGNIFICANT CHANGE UP (ref 3.5–5)
PROT SERPL-MCNC: 7 G/DL — SIGNIFICANT CHANGE UP (ref 6–8)
PROT UR-MCNC: SIGNIFICANT CHANGE UP
PROTHROM AB SERPL-ACNC: 11 SEC — SIGNIFICANT CHANGE UP (ref 9.95–12.87)
RBC # BLD: 5.24 M/UL — SIGNIFICANT CHANGE UP (ref 4.7–6.1)
RBC # FLD: 14.9 % — HIGH (ref 11.5–14.5)
SODIUM SERPL-SCNC: 141 MMOL/L — SIGNIFICANT CHANGE UP (ref 135–146)
SP GR SPEC: 1.03 — SIGNIFICANT CHANGE UP (ref 1.01–1.03)
UROBILINOGEN FLD QL: SIGNIFICANT CHANGE UP
WBC # BLD: 8.27 K/UL — SIGNIFICANT CHANGE UP (ref 4.8–10.8)
WBC # FLD AUTO: 8.27 K/UL — SIGNIFICANT CHANGE UP (ref 4.8–10.8)

## 2021-09-17 PROCEDURE — 93010 ELECTROCARDIOGRAM REPORT: CPT

## 2021-09-17 PROCEDURE — 71046 X-RAY EXAM CHEST 2 VIEWS: CPT | Mod: 26

## 2021-09-17 NOTE — H&P PST ADULT - NSICDXPASTMEDICALHX_GEN_ALL_CORE_FT
PAST MEDICAL HISTORY:  CAD (coronary artery disease) s/p pci    H/O heart artery stent x 2    HLD (hyperlipidemia)     HTN (hypertension)     Myocardial infarction 2018

## 2021-09-17 NOTE — H&P PST ADULT - HISTORY OF PRESENT ILLNESS
Patient is a 76 year old male presenting to [facility] in preparation for  right humerus repair non-union on 9/22 under gen anesthesia by Dr. Moody  Pt reports that he was walking outside and he tripped sustained a fx to right humerus  scheduled for repair of same  reports no c/o cp,sob,palpitations,cough or dysuria  1-2 fos without sob    Patient denies any signs or symptoms of COVID 19 and denies contact with known positive individuals.  They have an appointment for repeat COVID testing pre-procedure and acknowledge its time and place.  They were instructed to quarantine pre-procedure, practice exposure control measures, continue to self-monitor and report any concerns to their proceduralist.    Anesthesia Alert  NO--Difficult Airway  NO--History of neck surgery or radiation  NO--Limited ROM of neck  NO--History of Malignant hyperthermia  NO--Personal or family history of Pseudocholinesterase deficiency  NO--Prior Anesthesia Complication  NO--Latex Allergy  yes--Loose teeth/ partial dentures  NO--History of Rheumatoid Arthritis  NO--SAM  yes-- BLEEDING RISK/ pt on brilinta  NO--Other_____    Patient verbalized understanding of instructions and was given the opportunity to ask questions and have them answered.  As per patient, this is their complete medical and surgical history, including medications both prescribed or over the counter.

## 2021-09-17 NOTE — H&P PST ADULT - ATTENDING COMMENTS
Orthopedic Attending  Patient seen and examined.  PMHx, Psurg Hx, Medications and Allergies reviewed.  X-rays and laboratory reviewed.  Agree with findings, assessment and plan.    Risks, benefits and alternative to surgical management of the patient's fracture were again reviewed with patient, his questions answered to his satisfaction and he wishes to proceed with proposed surgery today.

## 2021-09-17 NOTE — H&P PST ADULT - BREASTS
Patient received on a hospital bed awake,pleasant and cooperative.We will 
monitor. No masses; no nipple discharge

## 2021-09-19 ENCOUNTER — LABORATORY RESULT (OUTPATIENT)
Age: 76
End: 2021-09-19

## 2021-09-19 ENCOUNTER — OUTPATIENT (OUTPATIENT)
Dept: OUTPATIENT SERVICES | Facility: HOSPITAL | Age: 76
LOS: 1 days | Discharge: HOME | End: 2021-09-19

## 2021-09-19 DIAGNOSIS — Z98.890 OTHER SPECIFIED POSTPROCEDURAL STATES: Chronic | ICD-10-CM

## 2021-09-19 DIAGNOSIS — Z95.5 PRESENCE OF CORONARY ANGIOPLASTY IMPLANT AND GRAFT: Chronic | ICD-10-CM

## 2021-09-19 DIAGNOSIS — Z11.59 ENCOUNTER FOR SCREENING FOR OTHER VIRAL DISEASES: ICD-10-CM

## 2021-09-19 PROBLEM — I21.9 ACUTE MYOCARDIAL INFARCTION, UNSPECIFIED: Chronic | Status: ACTIVE | Noted: 2021-09-17

## 2021-09-20 DIAGNOSIS — S42.321D DISPLACED TRANSVERSE FRACTURE OF SHAFT OF HUMERUS, RIGHT ARM, SUBSEQUENT ENCOUNTER FOR FRACTURE WITH ROUTINE HEALING: ICD-10-CM

## 2021-09-20 PROBLEM — Z00.00 ENCOUNTER FOR PREVENTIVE HEALTH EXAMINATION: Status: ACTIVE | Noted: 2021-09-20

## 2021-09-22 ENCOUNTER — RESULT REVIEW (OUTPATIENT)
Age: 76
End: 2021-09-22

## 2021-09-22 ENCOUNTER — INPATIENT (INPATIENT)
Facility: HOSPITAL | Age: 76
LOS: 0 days | Discharge: HOME | End: 2021-09-23
Attending: ORTHOPAEDIC SURGERY | Admitting: ORTHOPAEDIC SURGERY
Payer: MEDICARE

## 2021-09-22 VITALS
HEART RATE: 73 BPM | OXYGEN SATURATION: 99 % | WEIGHT: 210.1 LBS | HEIGHT: 66 IN | TEMPERATURE: 98 F | DIASTOLIC BLOOD PRESSURE: 83 MMHG | SYSTOLIC BLOOD PRESSURE: 165 MMHG

## 2021-09-22 DIAGNOSIS — Z98.890 OTHER SPECIFIED POSTPROCEDURAL STATES: Chronic | ICD-10-CM

## 2021-09-22 DIAGNOSIS — Z95.5 PRESENCE OF CORONARY ANGIOPLASTY IMPLANT AND GRAFT: Chronic | ICD-10-CM

## 2021-09-22 LAB
BLD GP AB SCN SERPL QL: SIGNIFICANT CHANGE UP
ERYTHROCYTE [SEDIMENTATION RATE] IN BLOOD: 10 MM/HR — SIGNIFICANT CHANGE UP (ref 0–10)

## 2021-09-22 PROCEDURE — 88311 DECALCIFY TISSUE: CPT | Mod: 26

## 2021-09-22 PROCEDURE — 88305 TISSUE EXAM BY PATHOLOGIST: CPT | Mod: 26

## 2021-09-22 RX ORDER — METOPROLOL TARTRATE 50 MG
1 TABLET ORAL
Qty: 0 | Refills: 0 | DISCHARGE

## 2021-09-22 RX ORDER — HYDROMORPHONE HYDROCHLORIDE 2 MG/ML
1 INJECTION INTRAMUSCULAR; INTRAVENOUS; SUBCUTANEOUS
Refills: 0 | Status: DISCONTINUED | OUTPATIENT
Start: 2021-09-22 | End: 2021-09-23

## 2021-09-22 RX ORDER — OXYCODONE HYDROCHLORIDE 5 MG/1
1 TABLET ORAL
Qty: 20 | Refills: 0
Start: 2021-09-22

## 2021-09-22 RX ORDER — CEPHALEXIN 500 MG
1 CAPSULE ORAL
Qty: 8 | Refills: 0
Start: 2021-09-22 | End: 2021-09-23

## 2021-09-22 RX ORDER — ONDANSETRON 8 MG/1
4 TABLET, FILM COATED ORAL ONCE
Refills: 0 | Status: DISCONTINUED | OUTPATIENT
Start: 2021-09-22 | End: 2021-09-23

## 2021-09-22 RX ORDER — LOSARTAN POTASSIUM 100 MG/1
1 TABLET, FILM COATED ORAL
Qty: 0 | Refills: 0 | DISCHARGE

## 2021-09-22 RX ORDER — HYDROMORPHONE HYDROCHLORIDE 2 MG/ML
0.5 INJECTION INTRAMUSCULAR; INTRAVENOUS; SUBCUTANEOUS
Refills: 0 | Status: DISCONTINUED | OUTPATIENT
Start: 2021-09-22 | End: 2021-09-23

## 2021-09-22 RX ORDER — FENTANYL CITRATE 50 UG/ML
25 INJECTION INTRAVENOUS
Refills: 0 | Status: DISCONTINUED | OUTPATIENT
Start: 2021-09-22 | End: 2021-09-23

## 2021-09-22 RX ORDER — TICAGRELOR 90 MG/1
1 TABLET ORAL
Qty: 0 | Refills: 0 | DISCHARGE

## 2021-09-22 NOTE — BRIEF OPERATIVE NOTE - NSICDXBRIEFPREOP_GEN_ALL_CORE_FT
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

## 2021-09-22 NOTE — ASU PREOP CHECKLIST - IDENTIFICATION BAND VERIFIED
well developed, well nourished , in no acute distress , ambulating without difficulty , normal communication ability
done

## 2021-09-22 NOTE — BRIEF OPERATIVE NOTE - NSICDXBRIEFPOSTOP_GEN_ALL_CORE_FT
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

## 2021-09-22 NOTE — BRIEF OPERATIVE NOTE - OPERATION/FINDINGS
above with still shoulder, post manipulation  degrees, pre - 90 degree; post op 1 lbs lifting, AROM, Abx per protocol  Dict:  Implants: Synthes 10 locking proximal humerus plate secured with11 x 3.5mm locking; 2 x 3.5 cortical screws above with still shoulder, post manipulation  degrees, pre - 90 degree; post op 1 lbs lifting, AROM, Abx per protocol  Dict:[id number]  Implants: Synthes 10 locking proximal humerus plate secured with11 x 3.5mm locking; 2 x 3.5 cortical screws

## 2021-09-22 NOTE — BRIEF OPERATIVE NOTE - NSICDXBRIEFPROCEDURE_GEN_ALL_CORE_FT
PROCEDURES:  Repair of fracture nonunion of humerus using compression technique 22-Sep-2021 21:41:21 CPT code 18188 Rancho Moody  Removal of deep implant 22-Sep-2021 21:43:42 CPT code 66611 Rancho Moody  Closed manipulation of right shoulder joint 22-Sep-2021 21:44:03  Rancho Moody   PROCEDURES:  Repair of fracture nonunion of humerus using compression technique 22-Sep-2021 21:41:21 CPT code 04626 Rancho Moody  Removal of deep implant 22-Sep-2021 21:43:42 CPT code 91255 Rancho Moody  Closed manipulation of right shoulder joint 22-Sep-2021 21:44:03 CPT code 73003 Rancho Moody

## 2021-09-22 NOTE — CHART NOTE - NSCHARTNOTEFT_GEN_A_CORE
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    Failed orthopedic implant    Arthrofibrosis of right shoulder        ____  Intubated  TV:______       Rate: ______      FiO2: ______    __x__  Patent Airway    ____  Full return of protective reflexes    ____  Full recovery from anesthesia / back to baseline     Vitals:   T:    98       R: 12                 BP:    115/68               Sat:    93%               P:  112      Mental Status:  ____ Awake   _____ Alert   __x___ Drowsy   _____ Sedated    Nausea/Vomiting:  __x__ NO  ______Yes,   See Post - Op Orders          Pain Scale (0-10):  _____    Treatment: ____ None    ___x_ See Post - Op/PCA Orders    Post - Operative Fluids:   ____ Oral   ___x_ See Post - Op Orders    Plan: Discharge:   ____Home       ___x__Floor     _____Critical Care    _____  Other:_________________    Comments: Uneventful intraoperative course. No anesthesia issues or complications noted.  Patient stable upon arrival to PACU. Report given to RN. Discharge when criteria met.

## 2021-09-22 NOTE — PRE-ANESTHESIA EVALUATION ADULT - NSRADCARDRESULTSFT_GEN_ALL_CORE
1. LV Ejection Fraction by Montez's Method with a biplane EF of 71 %.   2. Normal left ventricular size and wall thicknesses, with normal   systolic function.   3. The mean global longitduinal strain by speckle tracking is 23.0%   which is normal.   4. Mild tricuspid regurgitation.   5. Mild aortic regurgitation.   6. Pulmonic valve regurgitation.   7. LA volume Index is 18.7 ml/m² ml/m2.    1.   No evidence for ischemia at the level of exercise attained .  2.   Fixed defect in the inferolateral wall of the left ventricle which   does not thicken completely consistent with prior injury (scar)  3.  Dilated left ventricle with normal global left ventricular wall   motion. All other walls of the left ventricle thicken.  4.  Left ventricular ejection fraction calculated as 66 % which is within   the range of normal. Echocardiographic estimated ejection fraction was   71% on 3/5/2019 and 60-65% on 6/29/2018

## 2021-09-23 ENCOUNTER — TRANSCRIPTION ENCOUNTER (OUTPATIENT)
Age: 76
End: 2021-09-23

## 2021-09-23 VITALS — SYSTOLIC BLOOD PRESSURE: 105 MMHG | HEART RATE: 92 BPM | TEMPERATURE: 96 F | DIASTOLIC BLOOD PRESSURE: 57 MMHG

## 2021-09-23 LAB
CRP SERPL-MCNC: 4 MG/L — SIGNIFICANT CHANGE UP
GRAM STN FLD: SIGNIFICANT CHANGE UP
SPECIMEN SOURCE: SIGNIFICANT CHANGE UP

## 2021-09-23 RX ORDER — ATORVASTATIN CALCIUM 80 MG/1
80 TABLET, FILM COATED ORAL AT BEDTIME
Refills: 0 | Status: DISCONTINUED | OUTPATIENT
Start: 2021-09-23 | End: 2021-09-23

## 2021-09-23 RX ORDER — ASPIRIN/CALCIUM CARB/MAGNESIUM 324 MG
81 TABLET ORAL DAILY
Refills: 0 | Status: DISCONTINUED | OUTPATIENT
Start: 2021-09-23 | End: 2021-09-23

## 2021-09-23 RX ORDER — TICAGRELOR 90 MG/1
60 TABLET ORAL DAILY
Refills: 0 | Status: DISCONTINUED | OUTPATIENT
Start: 2021-09-23 | End: 2021-09-23

## 2021-09-23 RX ORDER — METOPROLOL TARTRATE 50 MG
25 TABLET ORAL DAILY
Refills: 0 | Status: DISCONTINUED | OUTPATIENT
Start: 2021-09-23 | End: 2021-09-23

## 2021-09-23 RX ORDER — INFLUENZA VIRUS VACCINE 15; 15; 15; 15 UG/.5ML; UG/.5ML; UG/.5ML; UG/.5ML
0.5 SUSPENSION INTRAMUSCULAR ONCE
Refills: 0 | Status: DISCONTINUED | OUTPATIENT
Start: 2021-09-23 | End: 2021-09-23

## 2021-09-23 RX ORDER — LOSARTAN POTASSIUM 100 MG/1
50 TABLET, FILM COATED ORAL DAILY
Refills: 0 | Status: DISCONTINUED | OUTPATIENT
Start: 2021-09-23 | End: 2021-09-23

## 2021-09-23 RX ORDER — CEPHALEXIN 500 MG
1 CAPSULE ORAL
Qty: 4 | Refills: 0
Start: 2021-09-23 | End: 2021-09-24

## 2021-09-23 RX ORDER — OXYCODONE HYDROCHLORIDE 5 MG/1
5 TABLET ORAL EVERY 6 HOURS
Refills: 0 | Status: DISCONTINUED | OUTPATIENT
Start: 2021-09-23 | End: 2021-09-23

## 2021-09-23 RX ORDER — ENOXAPARIN SODIUM 100 MG/ML
40 INJECTION SUBCUTANEOUS DAILY
Refills: 0 | Status: DISCONTINUED | OUTPATIENT
Start: 2021-09-23 | End: 2021-09-23

## 2021-09-23 RX ORDER — CEFAZOLIN SODIUM 1 G
2000 VIAL (EA) INJECTION EVERY 8 HOURS
Refills: 0 | Status: DISCONTINUED | OUTPATIENT
Start: 2021-09-23 | End: 2021-09-23

## 2021-09-23 RX ADMIN — Medication 100 MILLIGRAM(S): at 06:13

## 2021-09-23 RX ADMIN — LOSARTAN POTASSIUM 50 MILLIGRAM(S): 100 TABLET, FILM COATED ORAL at 06:13

## 2021-09-23 RX ADMIN — TICAGRELOR 60 MILLIGRAM(S): 90 TABLET ORAL at 12:37

## 2021-09-23 RX ADMIN — Medication 81 MILLIGRAM(S): at 12:36

## 2021-09-23 RX ADMIN — Medication 25 MILLIGRAM(S): at 06:13

## 2021-09-23 NOTE — DISCHARGE NOTE PROVIDER - CARE PROVIDER_API CALL
Rancho Moody)  Orthopaedic Surgery  [address] [place] [address]  Phone: [phone]  Fax: [phone]  Follow Up Time: 2 weeks

## 2021-09-23 NOTE — DISCHARGE NOTE NURSING/CASE MANAGEMENT/SOCIAL WORK - PATIENT PORTAL LINK FT
You can access the FollowMyHealth Patient Portal offered by [facility] by registering at the following website: [email or web address]. By joining [facility]’s FollowMyHealth portal, you will also be able to view your health information using other applications (apps) compatible with our system.

## 2021-09-23 NOTE — DISCHARGE NOTE PROVIDER - NSDCFUADDINST_GEN_ALL_CORE_FT
Physical Therapy to be started upon discharge.   Weight bearing to 1 pound, begin active range of motion.   Please take post operative antibiotics to prevent infection.   Please keep surgical site clean and dry.   If any concerning symptoms please contact your surgeon or return to the ER.

## 2021-09-23 NOTE — PROGRESS NOTE ADULT - ASSESSMENT
ORTHOPEDIC SURGERY POC     76y Male s/p L HUMERUS nonunion takedown/ORIF. Resting comfortably. Denies significant pain.    Denies numbness/tingling.  Denies f/c/n/v/cp/sob.    Medications:  aspirin enteric coated 81 milliGRAM(s) Oral daily  atorvastatin 80 milliGRAM(s) Oral at bedtime  ceFAZolin   IVPB 2000 milliGRAM(s) IV Intermittent every 8 hours  fentaNYL    Injectable 25 MICROGram(s) IV Push every 5 minutes PRN  HYDROmorphone  Injectable 0.5 milliGRAM(s) IV Push every 10 minutes PRN  HYDROmorphone  Injectable 1 milliGRAM(s) IV Push every 10 minutes PRN  losartan 50 milliGRAM(s) Oral daily  metoprolol succinate ER 25 milliGRAM(s) Oral daily  ondansetron Injectable 4 milliGRAM(s) IV Push once PRN  oxyCODONE    IR 5 milliGRAM(s) Oral every 6 hours PRN  ticagrelor 60 milliGRAM(s) Oral daily    No Known Allergies      PMH/PSH:  HTN (hypertension)    HLD (hyperlipidemia)    H/O heart artery stent    CAD (coronary artery disease)    Myocardial infarction    No significant past surgical history    S/P drug eluting coronary stent placement    History of surgery        Exam:  T(C): 36.8 (09-23-21 @ 00:01), Max: 36.8 (09-22-21 @ 22:08)  HR: 99 (09-23-21 @ 00:01) (66 - 110)  BP: 125/73 (09-23-21 @ 00:01) (101/63 - 188/84)  RR: 21 (09-23-21 @ 00:01) (16 - 28)  SpO2: 95% (09-23-21 @ 00:01) (90% - 99%)  General: Awake, alert, NAD, AAOx3    LUE: Dressing c/d/i, SILT axillary, m/r/u distally.  Motor intact AIN/PIN/ulnar.   2+ radial/ulnar pulses      A/P:  76yMale with s/p L humerus nonunion takedown/ORIF    - NWB LUE  - Admit for overnight obs   - Continue home meds  - Ancef q8h for 3 doses  - DC tomorrow

## 2021-09-23 NOTE — DISCHARGE NOTE PROVIDER - NSDCCPTREATMENT_GEN_ALL_CORE_FT
PRINCIPAL PROCEDURE  Procedure: Repair of fracture nonunion of humerus using compression technique  Findings and Treatment: CPT code 00008      SECONDARY PROCEDURE  Procedure: Closed manipulation of right shoulder joint  Findings and Treatment: CPT code 08057    Procedure: Removal of deep implant  Findings and Treatment: CPT code 49700     PRINCIPAL PROCEDURE  Procedure: Repair of fracture nonunion of humerus using compression technique  Findings and Treatment: CPT code 70217      SECONDARY PROCEDURE  Procedure: Removal of deep implant  Findings and Treatment: CPT code 37409

## 2021-09-23 NOTE — DISCHARGE NOTE PROVIDER - NSDCCPCAREPLAN_GEN_ALL_CORE_FT
PRINCIPAL DISCHARGE DIAGNOSIS  Diagnosis: Fracture of left humerus with nonunion  Assessment and Plan of Treatment:        PRINCIPAL DISCHARGE DIAGNOSIS  Diagnosis: Fracture of shaft of right humerus with nonunion  Assessment and Plan of Treatment:

## 2021-09-23 NOTE — DISCHARGE NOTE PROVIDER - HOSPITAL COURSE
76y Male underwent a left humerus non union repair via open reduction and internal fixation on 9/22/2021. Patient tolerated surgery well with no intra/post operative complications. Patient was given intra/post operative antibiotics for infection prophylaxis. Patient will be discharged on ancef x2 days and a script for pain medication. Patient is allowed to lift 1lb post operatively with active ROM. Patient is stable for discharge, no events during admission. 76y Male underwent a left humerus non union repair via open reduction and internal fixation on 9/22/2021. Patient tolerated surgery well with no intra/post operative complications. Patient was given intra/post operative antibiotics for infection prophylaxis. Patient will be discharged on keflex x2 days and a script for pain medication. Patient is allowed to lift 1lb post operatively with active ROM. Patient is stable for discharge, no events during admission. 76y Male underwent a right  humerus non union repair via open reduction and internal fixation on 9/22/2021. Patient tolerated surgery well with no intra/post operative complications. Patient was given intra/post operative antibiotics for infection prophylaxis. Patient will be discharged on keflex x2 days and a script for pain medication. Patient is allowed to lift 1lb post operatively with active ROM. Patient is stable for discharge, no events during admission.

## 2021-09-23 NOTE — DISCHARGE NOTE PROVIDER - NSDCMRMEDTOKEN_GEN_ALL_CORE_FT
aspirin 81 mg oral delayed release tablet: 1 tab(s) orally once a day  atorvastatin 80 mg oral tablet: 1 tab(s) orally once a day (at bedtime)  Brilinta (ticagrelor) 60 mg oral tablet: 1 tab(s) orally 2 times a day  Keflex 500 mg oral capsule: 1 cap(s) orally every 12 hours MDD:2  losartan 50 mg oral tablet: 1 tab(s) orally once a day  metoprolol succinate 25 mg oral tablet, extended release: 1 tab(s) orally once a day  oxyCODONE 5 mg oral tablet: 1 tab(s) orally every 6 hours, As Needed -for severe pain MDD:4

## 2021-09-27 DIAGNOSIS — M24.611 ANKYLOSIS, RIGHT SHOULDER: ICD-10-CM

## 2021-09-27 DIAGNOSIS — I25.2 OLD MYOCARDIAL INFARCTION: ICD-10-CM
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
## 2021-09-27 DIAGNOSIS — E78.5 HYPERLIPIDEMIA, UNSPECIFIED: ICD-10-CM

## 2021-09-27 DIAGNOSIS — I10 ESSENTIAL (PRIMARY) HYPERTENSION: ICD-10-CM

## 2021-09-27 DIAGNOSIS — Z95.5 PRESENCE OF CORONARY ANGIOPLASTY IMPLANT AND GRAFT: ICD-10-CM

## 2021-09-27 DIAGNOSIS — T84.190A: ICD-10-CM

## 2021-09-27 DIAGNOSIS — Z79.82 LONG TERM (CURRENT) USE OF ASPIRIN: ICD-10-CM

## 2021-09-27 DIAGNOSIS — Z87.891 PERSONAL HISTORY OF NICOTINE DEPENDENCE: ICD-10-CM

## 2021-09-27 DIAGNOSIS — I25.10 ATHEROSCLEROTIC HEART DISEASE OF NATIVE CORONARY ARTERY WITHOUT ANGINA PECTORIS: ICD-10-CM
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
## 2021-09-27 DIAGNOSIS — Z79.02 LONG TERM (CURRENT) USE OF ANTITHROMBOTICS/ANTIPLATELETS: ICD-10-CM

## 2021-09-27 DIAGNOSIS — S42.401K UNSPECIFIED FRACTURE OF LOWER END OF RIGHT HUMERUS, SUBSEQUENT ENCOUNTER FOR FRACTURE WITH NONUNION: ICD-10-CM

## 2021-09-28 LAB
CULTURE RESULTS: SIGNIFICANT CHANGE UP
SPECIMEN SOURCE: SIGNIFICANT CHANGE UP
SURGICAL PATHOLOGY STUDY: SIGNIFICANT CHANGE UP

## 2021-10-06 ENCOUNTER — APPOINTMENT (OUTPATIENT)
Dept: OTOLARYNGOLOGY | Facility: CLINIC | Age: 76
End: 2021-10-06
Payer: MEDICARE

## 2021-10-06 DIAGNOSIS — E04.2 NONTOXIC MULTINODULAR GOITER: ICD-10-CM

## 2021-10-06 DIAGNOSIS — E04.9 NONTOXIC GOITER, UNSPECIFIED: ICD-10-CM

## 2021-10-06 PROCEDURE — 99204 OFFICE O/P NEW MOD 45 MIN: CPT | Mod: 25

## 2021-10-06 PROCEDURE — 31575 DIAGNOSTIC LARYNGOSCOPY: CPT

## 2021-10-06 NOTE — DATA REVIEWED
[de-identified] : Thyoid sono dates 1/29/2020: 5.3 cm thyroid cyst containing a 1.8 cm moderate to hughly suspcious mural solid nodule. \par spongiform left thyroid lobe nodules up to 0.7 cm are not suspicious. A 0.8 cm hyperechoic left lobe nodule, TR4. \par thyroid sono 8/16/21: since 2020 there is a significant increase in solid element within the 5.3 cm right lobe nodule. There is a 1.3 cm cystic nodule in the left lobe where previously there was a 0.5 cm moderately suspcious nodule. TR 4 reccomnedation. biopsy if the nodule is equal or greater than 1.5 cm and follow up if equal to 1.0 cm.

## 2021-10-06 NOTE — PHYSICAL EXAM
[Midline] : trachea located in midline position [Normal] : no rashes [de-identified] : thyroid palpable

## 2021-10-06 NOTE — HISTORY OF PRESENT ILLNESS
[de-identified] : Patient presents today accompanied by daughter due to thyroid nodule. Patient has h/o thyroid nodules for over 15 years. Saw endo approx. 6 years ago, has not followed up since.  No FNA done. No dysphagia. No choking.

## 2021-11-02 ENCOUNTER — LABORATORY RESULT (OUTPATIENT)
Age: 76
End: 2021-11-02

## 2022-11-14 NOTE — DISCHARGE NOTE ADULT - DEVELOP COPING SKILLS. FOR EXAMPLE, STRATEGIES AND LIFESTYLE CHANGES THAT REDUCE NEGATIVE MOODS, STRESS, AND EXPOSURE TO SMOKING CUES
Statement Selected Tremfya Counseling: I discussed with the patient the risks of guselkumab including but not limited to immunosuppression, serious infections, and drug reactions.  The patient understands that monitoring is required including a PPD at baseline and must alert us or the primary physician if symptoms of infection or other concerning signs are noted.

## 2024-02-20 NOTE — PATIENT PROFILE ADULT - HAS THE PATIENT RECEIVED THE INFLUENZA VACCINE THIS SEASON?
Routing refill request to provider for review/approval because:  Labs out of range:        1/28/2021     2:17 PM 2/25/2021     2:11 PM 10/4/2023     9:08 AM   PHQ   PHQ-9 Total Score 8 3 5   Q9: Thoughts of better off dead/self-harm past 2 weeks Not at all Not at all Not at all         1/28/2021     2:17 PM 2/25/2021     2:11 PM 10/4/2023    10:06 AM   BLAKE-7 SCORE   Total Score 11 8 15       Last Written Prescription Date:  10/4/23  Last Fill Quantity: 30,  # refills: 3   Last office visit: 10/4/2023 ; last virtual visit: Visit date not found with prescribing provider:     Future Office Visit:   Next 5 appointments (look out 90 days)      Feb 22, 2024  7:40 AM  (Arrive by 7:25 AM)  Return Visit with Bob You MD  [facility] ([facility] [place] ) [address]  [facility] [address]  [phone]           Julie Behrendt RN            no...

## 2024-07-24 NOTE — PRE-ANESTHESIA EVALUATION ADULT - ANESTHESIA, PREVIOUS REACTION, PROFILE
none Detail Level: Detailed Depth Of Biopsy: dermis Was A Bandage Applied: Yes Size Of Lesion In Cm: 0 Biopsy Type: H and E Biopsy Method: scissors Anesthesia Type: 1% lidocaine with epinephrine Anesthesia Volume In Cc: 1.5 Hemostasis: Aluminum Chloride and Electrocautery Wound Care: Petrolatum Dressing: bandage Destruction After The Procedure: No Type Of Destruction Used: Curettage Curettage Text: The wound bed was treated with curettage after the biopsy was performed. Cryotherapy Text: The wound bed was treated with cryotherapy after the biopsy was performed. Electrodesiccation Text: The wound bed was treated with electrodesiccation after the biopsy was performed. Electrodesiccation And Curettage Text: The wound bed was treated with electrodesiccation and curettage after the biopsy was performed. Silver Nitrate Text: The wound bed was treated with silver nitrate after the biopsy was performed. Consent: Verbal consent was obtained and risks were reviewed including but not limited to scarring, infection, bleeding, scabbing, incomplete removal, nerve damage and allergy to anesthesia. Post-Care Instructions: I reviewed with the patient in detail post-care instructions. Patient is to keep the biopsy site dry for 24 hours, and then apply petrolatum daily until healed. Notification Instructions: Patient will be notified of biopsy results. However, patient instructed to call the office if not contacted within 2 weeks. Billing Type: Third-Party Bill Information: Selecting Yes will display possible errors in your note based on the variables you have selected. This validation is only offered as a suggestion for you. PLEASE NOTE THAT THE VALIDATION TEXT WILL BE REMOVED WHEN YOU FINALIZE YOUR NOTE. IF YOU WANT TO FAX A PRELIMINARY NOTE YOU WILL NEED TO TOGGLE THIS TO 'NO' IF YOU DO NOT WANT IT IN YOUR FAXED NOTE.
